# Patient Record
Sex: MALE | Race: WHITE | Employment: OTHER | URBAN - METROPOLITAN AREA
[De-identification: names, ages, dates, MRNs, and addresses within clinical notes are randomized per-mention and may not be internally consistent; named-entity substitution may affect disease eponyms.]

---

## 2018-06-19 ENCOUNTER — APPOINTMENT (OUTPATIENT)
Dept: GENERAL RADIOLOGY | Age: 73
DRG: 192 | End: 2018-06-19
Payer: MEDICARE

## 2018-06-19 ENCOUNTER — HOSPITAL ENCOUNTER (INPATIENT)
Age: 73
LOS: 1 days | Discharge: HOME | DRG: 192 | End: 2018-06-20
Attending: EMERGENCY MEDICINE | Admitting: INTERNAL MEDICINE
Payer: MEDICARE

## 2018-06-19 DIAGNOSIS — J44.1 COPD EXACERBATION (HCC): Primary | ICD-10-CM

## 2018-06-19 LAB
ABSOLUTE EOS #: 0 K/UL (ref 0–0.4)
ABSOLUTE IMMATURE GRANULOCYTE: ABNORMAL K/UL (ref 0–0.3)
ABSOLUTE LYMPH #: 1.8 K/UL (ref 1–4.8)
ABSOLUTE MONO #: 1 K/UL (ref 0.2–0.8)
ANION GAP SERPL CALCULATED.3IONS-SCNC: 16 MMOL/L (ref 9–17)
BASOPHILS # BLD: 0 % (ref 0–2)
BASOPHILS ABSOLUTE: 0 K/UL (ref 0–0.2)
BUN BLDV-MCNC: 20 MG/DL (ref 8–23)
BUN/CREAT BLD: 22 (ref 9–20)
CALCIUM SERPL-MCNC: 9.2 MG/DL (ref 8.6–10.4)
CHLORIDE BLD-SCNC: 100 MMOL/L (ref 98–107)
CO2: 23 MMOL/L (ref 20–31)
CREAT SERPL-MCNC: 0.92 MG/DL (ref 0.7–1.2)
DIFFERENTIAL TYPE: ABNORMAL
EKG ATRIAL RATE: 63 BPM
EKG P AXIS: 70 DEGREES
EKG P-R INTERVAL: 174 MS
EKG Q-T INTERVAL: 420 MS
EKG QRS DURATION: 70 MS
EKG QTC CALCULATION (BAZETT): 429 MS
EKG R AXIS: 83 DEGREES
EKG T AXIS: 60 DEGREES
EKG VENTRICULAR RATE: 63 BPM
EOSINOPHILS RELATIVE PERCENT: 0 % (ref 1–4)
GFR AFRICAN AMERICAN: >60 ML/MIN
GFR NON-AFRICAN AMERICAN: >60 ML/MIN
GFR SERPL CREATININE-BSD FRML MDRD: ABNORMAL ML/MIN/{1.73_M2}
GFR SERPL CREATININE-BSD FRML MDRD: ABNORMAL ML/MIN/{1.73_M2}
GLUCOSE BLD-MCNC: 116 MG/DL (ref 70–99)
HCT VFR BLD CALC: 52.8 % (ref 41–53)
HEMOGLOBIN: 17.4 G/DL (ref 13.5–17.5)
IMMATURE GRANULOCYTES: ABNORMAL %
LYMPHOCYTES # BLD: 12 % (ref 24–44)
MCH RBC QN AUTO: 32.4 PG (ref 26–34)
MCHC RBC AUTO-ENTMCNC: 32.9 G/DL (ref 31–37)
MCV RBC AUTO: 98.3 FL (ref 80–100)
MONOCYTES # BLD: 7 % (ref 1–7)
NRBC AUTOMATED: ABNORMAL PER 100 WBC
PDW BLD-RTO: 14.6 % (ref 11.5–14.5)
PLATELET # BLD: 224 K/UL (ref 130–400)
PLATELET ESTIMATE: ABNORMAL
PMV BLD AUTO: 8 FL (ref 6–12)
POTASSIUM SERPL-SCNC: 4.1 MMOL/L (ref 3.7–5.3)
RBC # BLD: 5.38 M/UL (ref 4.5–5.9)
RBC # BLD: ABNORMAL 10*6/UL
SEG NEUTROPHILS: 81 % (ref 36–66)
SEGMENTED NEUTROPHILS ABSOLUTE COUNT: 12.1 K/UL (ref 1.8–7.7)
SODIUM BLD-SCNC: 139 MMOL/L (ref 135–144)
WBC # BLD: 15 K/UL (ref 3.5–11)
WBC # BLD: ABNORMAL 10*3/UL

## 2018-06-19 PROCEDURE — 93005 ELECTROCARDIOGRAM TRACING: CPT

## 2018-06-19 PROCEDURE — 2580000003 HC RX 258: Performed by: NURSE PRACTITIONER

## 2018-06-19 PROCEDURE — 6360000002 HC RX W HCPCS: Performed by: EMERGENCY MEDICINE

## 2018-06-19 PROCEDURE — 87040 BLOOD CULTURE FOR BACTERIA: CPT

## 2018-06-19 PROCEDURE — 87205 SMEAR GRAM STAIN: CPT

## 2018-06-19 PROCEDURE — 94150 VITAL CAPACITY TEST: CPT

## 2018-06-19 PROCEDURE — 94640 AIRWAY INHALATION TREATMENT: CPT

## 2018-06-19 PROCEDURE — 1200000000 HC SEMI PRIVATE

## 2018-06-19 PROCEDURE — 71045 X-RAY EXAM CHEST 1 VIEW: CPT

## 2018-06-19 PROCEDURE — 6370000000 HC RX 637 (ALT 250 FOR IP): Performed by: NURSE PRACTITIONER

## 2018-06-19 PROCEDURE — 2580000003 HC RX 258: Performed by: EMERGENCY MEDICINE

## 2018-06-19 PROCEDURE — 80048 BASIC METABOLIC PNL TOTAL CA: CPT

## 2018-06-19 PROCEDURE — 99285 EMERGENCY DEPT VISIT HI MDM: CPT

## 2018-06-19 PROCEDURE — 85025 COMPLETE CBC W/AUTO DIFF WBC: CPT

## 2018-06-19 PROCEDURE — 87070 CULTURE OTHR SPECIMN AEROBIC: CPT

## 2018-06-19 RX ORDER — METHYLPREDNISOLONE SODIUM SUCCINATE 125 MG/2ML
80 INJECTION, POWDER, LYOPHILIZED, FOR SOLUTION INTRAMUSCULAR; INTRAVENOUS EVERY 8 HOURS
Status: DISCONTINUED | OUTPATIENT
Start: 2018-06-20 | End: 2018-06-20 | Stop reason: HOSPADM

## 2018-06-19 RX ORDER — METHYLPREDNISOLONE SODIUM SUCCINATE 125 MG/2ML
125 INJECTION, POWDER, LYOPHILIZED, FOR SOLUTION INTRAMUSCULAR; INTRAVENOUS ONCE
Status: COMPLETED | OUTPATIENT
Start: 2018-06-19 | End: 2018-06-19

## 2018-06-19 RX ORDER — ALBUTEROL SULFATE 2.5 MG/3ML
2.5 SOLUTION RESPIRATORY (INHALATION)
Status: DISCONTINUED | OUTPATIENT
Start: 2018-06-19 | End: 2018-06-20 | Stop reason: HOSPADM

## 2018-06-19 RX ORDER — IPRATROPIUM BROMIDE AND ALBUTEROL SULFATE 2.5; .5 MG/3ML; MG/3ML
1 SOLUTION RESPIRATORY (INHALATION)
Status: DISCONTINUED | OUTPATIENT
Start: 2018-06-20 | End: 2018-06-20 | Stop reason: HOSPADM

## 2018-06-19 RX ORDER — ALBUTEROL SULFATE 90 UG/1
2 AEROSOL, METERED RESPIRATORY (INHALATION) EVERY 6 HOURS PRN
COMMUNITY

## 2018-06-19 RX ORDER — ONDANSETRON 2 MG/ML
4 INJECTION INTRAMUSCULAR; INTRAVENOUS EVERY 6 HOURS PRN
Status: DISCONTINUED | OUTPATIENT
Start: 2018-06-19 | End: 2018-06-20 | Stop reason: HOSPADM

## 2018-06-19 RX ORDER — FAMOTIDINE 20 MG/1
20 TABLET, FILM COATED ORAL 2 TIMES DAILY
Status: DISCONTINUED | OUTPATIENT
Start: 2018-06-19 | End: 2018-06-20 | Stop reason: HOSPADM

## 2018-06-19 RX ORDER — ALBUTEROL SULFATE 2.5 MG/3ML
5 SOLUTION RESPIRATORY (INHALATION)
Status: DISCONTINUED | OUTPATIENT
Start: 2018-06-19 | End: 2018-06-19

## 2018-06-19 RX ORDER — IPRATROPIUM BROMIDE AND ALBUTEROL SULFATE 2.5; .5 MG/3ML; MG/3ML
1 SOLUTION RESPIRATORY (INHALATION)
Status: DISCONTINUED | OUTPATIENT
Start: 2018-06-19 | End: 2018-06-19

## 2018-06-19 RX ORDER — SODIUM CHLORIDE 0.9 % (FLUSH) 0.9 %
10 SYRINGE (ML) INJECTION EVERY 12 HOURS SCHEDULED
Status: DISCONTINUED | OUTPATIENT
Start: 2018-06-19 | End: 2018-06-20 | Stop reason: HOSPADM

## 2018-06-19 RX ORDER — NICOTINE 21 MG/24HR
1 PATCH, TRANSDERMAL 24 HOURS TRANSDERMAL DAILY PRN
Status: DISCONTINUED | OUTPATIENT
Start: 2018-06-19 | End: 2018-06-20 | Stop reason: HOSPADM

## 2018-06-19 RX ORDER — ATORVASTATIN CALCIUM 10 MG/1
10 TABLET, FILM COATED ORAL DAILY
COMMUNITY

## 2018-06-19 RX ORDER — SODIUM CHLORIDE 0.9 % (FLUSH) 0.9 %
10 SYRINGE (ML) INJECTION PRN
Status: DISCONTINUED | OUTPATIENT
Start: 2018-06-19 | End: 2018-06-20 | Stop reason: HOSPADM

## 2018-06-19 RX ORDER — ALBUTEROL SULFATE 90 UG/1
2 AEROSOL, METERED RESPIRATORY (INHALATION)
Status: DISCONTINUED | OUTPATIENT
Start: 2018-06-19 | End: 2018-06-19

## 2018-06-19 RX ORDER — ATORVASTATIN CALCIUM 10 MG/1
10 TABLET, FILM COATED ORAL DAILY
Status: DISCONTINUED | OUTPATIENT
Start: 2018-06-20 | End: 2018-06-20 | Stop reason: HOSPADM

## 2018-06-19 RX ORDER — BISACODYL 10 MG
10 SUPPOSITORY, RECTAL RECTAL DAILY PRN
Status: DISCONTINUED | OUTPATIENT
Start: 2018-06-19 | End: 2018-06-20 | Stop reason: HOSPADM

## 2018-06-19 RX ORDER — AZITHROMYCIN 250 MG/1
250 TABLET, FILM COATED ORAL DAILY
Status: DISCONTINUED | OUTPATIENT
Start: 2018-06-21 | End: 2018-06-20 | Stop reason: HOSPADM

## 2018-06-19 RX ORDER — LISINOPRIL 10 MG/1
10 TABLET ORAL 2 TIMES DAILY
Status: DISCONTINUED | OUTPATIENT
Start: 2018-06-19 | End: 2018-06-20 | Stop reason: HOSPADM

## 2018-06-19 RX ORDER — SODIUM CHLORIDE 9 MG/ML
INJECTION, SOLUTION INTRAVENOUS CONTINUOUS
Status: DISCONTINUED | OUTPATIENT
Start: 2018-06-19 | End: 2018-06-20 | Stop reason: HOSPADM

## 2018-06-19 RX ORDER — LISINOPRIL 10 MG/1
10 TABLET ORAL 2 TIMES DAILY
COMMUNITY

## 2018-06-19 RX ORDER — AZITHROMYCIN 250 MG/1
500 TABLET, FILM COATED ORAL DAILY
Status: COMPLETED | OUTPATIENT
Start: 2018-06-20 | End: 2018-06-20

## 2018-06-19 RX ORDER — AZITHROMYCIN 250 MG/1
250 TABLET, FILM COATED ORAL DAILY
COMMUNITY
End: 2018-06-20 | Stop reason: ALTCHOICE

## 2018-06-19 RX ADMIN — LISINOPRIL 10 MG: 10 TABLET ORAL at 22:16

## 2018-06-19 RX ADMIN — METHYLPREDNISOLONE SODIUM SUCCINATE 125 MG: 125 INJECTION, POWDER, FOR SOLUTION INTRAMUSCULAR; INTRAVENOUS at 19:50

## 2018-06-19 RX ADMIN — FAMOTIDINE 20 MG: 20 TABLET, FILM COATED ORAL at 22:16

## 2018-06-19 RX ADMIN — ALBUTEROL SULFATE 5 MG: 5 SOLUTION RESPIRATORY (INHALATION) at 19:49

## 2018-06-19 RX ADMIN — CEFTRIAXONE SODIUM 1 G: 1 INJECTION, POWDER, FOR SOLUTION INTRAMUSCULAR; INTRAVENOUS at 20:48

## 2018-06-19 RX ADMIN — AZITHROMYCIN MONOHYDRATE 500 MG: 500 INJECTION, POWDER, LYOPHILIZED, FOR SOLUTION INTRAVENOUS at 21:37

## 2018-06-19 RX ADMIN — SODIUM CHLORIDE: 9 INJECTION, SOLUTION INTRAVENOUS at 20:42

## 2018-06-19 RX ADMIN — ALBUTEROL SULFATE 5 MG: 5 SOLUTION RESPIRATORY (INHALATION) at 19:58

## 2018-06-19 RX ADMIN — IPRATROPIUM BROMIDE AND ALBUTEROL SULFATE 1 AMPULE: .5; 3 SOLUTION RESPIRATORY (INHALATION) at 23:47

## 2018-06-19 ASSESSMENT — ENCOUNTER SYMPTOMS
ABDOMINAL PAIN: 0
EYE REDNESS: 0
SHORTNESS OF BREATH: 1
DIARRHEA: 0
COUGH: 1
WHEEZING: 1
COLOR CHANGE: 0
FACIAL SWELLING: 0
CONSTIPATION: 0
EYE DISCHARGE: 0
VOMITING: 0

## 2018-06-19 ASSESSMENT — PAIN SCALES - GENERAL: PAINLEVEL_OUTOF10: 6

## 2018-06-20 ENCOUNTER — APPOINTMENT (OUTPATIENT)
Dept: GENERAL RADIOLOGY | Age: 73
DRG: 192 | End: 2018-06-20
Payer: MEDICARE

## 2018-06-20 VITALS
OXYGEN SATURATION: 91 % | TEMPERATURE: 98.6 F | WEIGHT: 160 LBS | HEIGHT: 70 IN | RESPIRATION RATE: 16 BRPM | HEART RATE: 71 BPM | BODY MASS INDEX: 22.9 KG/M2 | DIASTOLIC BLOOD PRESSURE: 67 MMHG | SYSTOLIC BLOOD PRESSURE: 117 MMHG

## 2018-06-20 PROBLEM — E78.2 MIXED HYPERLIPIDEMIA: Status: ACTIVE | Noted: 2018-06-20

## 2018-06-20 PROBLEM — I10 ESSENTIAL HYPERTENSION: Status: ACTIVE | Noted: 2018-06-20

## 2018-06-20 LAB
ANION GAP SERPL CALCULATED.3IONS-SCNC: 13 MMOL/L (ref 9–17)
BUN BLDV-MCNC: 20 MG/DL (ref 8–23)
BUN/CREAT BLD: 15 (ref 9–20)
CALCIUM SERPL-MCNC: 8.8 MG/DL (ref 8.6–10.4)
CHLORIDE BLD-SCNC: 101 MMOL/L (ref 98–107)
CO2: 24 MMOL/L (ref 20–31)
CREAT SERPL-MCNC: 1.3 MG/DL (ref 0.7–1.2)
GFR AFRICAN AMERICAN: >60 ML/MIN
GFR NON-AFRICAN AMERICAN: 55 ML/MIN
GFR SERPL CREATININE-BSD FRML MDRD: ABNORMAL ML/MIN/{1.73_M2}
GFR SERPL CREATININE-BSD FRML MDRD: ABNORMAL ML/MIN/{1.73_M2}
GLUCOSE BLD-MCNC: 172 MG/DL (ref 70–99)
HCT VFR BLD CALC: 46.1 % (ref 41–53)
HEMOGLOBIN: 15.6 G/DL (ref 13.5–17.5)
MCH RBC QN AUTO: 32.9 PG (ref 26–34)
MCHC RBC AUTO-ENTMCNC: 33.9 G/DL (ref 31–37)
MCV RBC AUTO: 97 FL (ref 80–100)
NRBC AUTOMATED: ABNORMAL PER 100 WBC
PDW BLD-RTO: 14.7 % (ref 11.5–14.5)
PLATELET # BLD: 196 K/UL (ref 130–400)
PMV BLD AUTO: 8.2 FL (ref 6–12)
POTASSIUM SERPL-SCNC: 4.6 MMOL/L (ref 3.7–5.3)
RBC # BLD: 4.75 M/UL (ref 4.5–5.9)
SODIUM BLD-SCNC: 138 MMOL/L (ref 135–144)
WBC # BLD: 11.1 K/UL (ref 3.5–11)

## 2018-06-20 PROCEDURE — G8988 SELF CARE GOAL STATUS: HCPCS

## 2018-06-20 PROCEDURE — 97165 OT EVAL LOW COMPLEX 30 MIN: CPT

## 2018-06-20 PROCEDURE — 6360000002 HC RX W HCPCS: Performed by: NURSE PRACTITIONER

## 2018-06-20 PROCEDURE — 94640 AIRWAY INHALATION TREATMENT: CPT

## 2018-06-20 PROCEDURE — 36415 COLL VENOUS BLD VENIPUNCTURE: CPT

## 2018-06-20 PROCEDURE — 80048 BASIC METABOLIC PNL TOTAL CA: CPT

## 2018-06-20 PROCEDURE — 2700000000 HC OXYGEN THERAPY PER DAY

## 2018-06-20 PROCEDURE — 6370000000 HC RX 637 (ALT 250 FOR IP): Performed by: NURSE PRACTITIONER

## 2018-06-20 PROCEDURE — 94761 N-INVAS EAR/PLS OXIMETRY MLT: CPT

## 2018-06-20 PROCEDURE — 97535 SELF CARE MNGMENT TRAINING: CPT

## 2018-06-20 PROCEDURE — 99223 1ST HOSP IP/OBS HIGH 75: CPT | Performed by: INTERNAL MEDICINE

## 2018-06-20 PROCEDURE — 71045 X-RAY EXAM CHEST 1 VIEW: CPT

## 2018-06-20 PROCEDURE — 85027 COMPLETE CBC AUTOMATED: CPT

## 2018-06-20 PROCEDURE — 97161 PT EVAL LOW COMPLEX 20 MIN: CPT

## 2018-06-20 PROCEDURE — G8979 MOBILITY GOAL STATUS: HCPCS

## 2018-06-20 PROCEDURE — G8987 SELF CARE CURRENT STATUS: HCPCS

## 2018-06-20 PROCEDURE — G8978 MOBILITY CURRENT STATUS: HCPCS

## 2018-06-20 RX ORDER — ACETAMINOPHEN 500 MG
500 TABLET ORAL EVERY 6 HOURS PRN
COMMUNITY

## 2018-06-20 RX ORDER — OXYCODONE HYDROCHLORIDE AND ACETAMINOPHEN 5; 325 MG/1; MG/1
1 TABLET ORAL EVERY 4 HOURS PRN
Status: DISCONTINUED | OUTPATIENT
Start: 2018-06-20 | End: 2018-06-20 | Stop reason: HOSPADM

## 2018-06-20 RX ORDER — PREDNISONE 10 MG/1
TABLET ORAL
Qty: 30 TABLET | Refills: 0 | Status: SHIPPED | OUTPATIENT
Start: 2018-06-20

## 2018-06-20 RX ORDER — CEPHALEXIN 250 MG/1
250 CAPSULE ORAL 4 TIMES DAILY
Qty: 40 CAPSULE | Refills: 0 | Status: SHIPPED | OUTPATIENT
Start: 2018-06-20 | End: 2018-06-30

## 2018-06-20 RX ORDER — ACETAMINOPHEN 325 MG/1
650 TABLET ORAL EVERY 4 HOURS PRN
Status: DISCONTINUED | OUTPATIENT
Start: 2018-06-20 | End: 2018-06-20 | Stop reason: HOSPADM

## 2018-06-20 RX ORDER — BUDESONIDE AND FORMOTEROL FUMARATE DIHYDRATE 160; 4.5 UG/1; UG/1
2 AEROSOL RESPIRATORY (INHALATION) 2 TIMES DAILY
COMMUNITY

## 2018-06-20 RX ORDER — AZITHROMYCIN 250 MG/1
250 TABLET, FILM COATED ORAL DAILY
Qty: 10 TABLET | Refills: 0 | Status: SHIPPED | OUTPATIENT
Start: 2018-06-20 | End: 2018-06-30

## 2018-06-20 RX ORDER — MORPHINE SULFATE 4 MG/ML
4 INJECTION, SOLUTION INTRAMUSCULAR; INTRAVENOUS
Status: DISCONTINUED | OUTPATIENT
Start: 2018-06-20 | End: 2018-06-20 | Stop reason: HOSPADM

## 2018-06-20 RX ORDER — OXYCODONE HYDROCHLORIDE AND ACETAMINOPHEN 5; 325 MG/1; MG/1
2 TABLET ORAL EVERY 4 HOURS PRN
Status: DISCONTINUED | OUTPATIENT
Start: 2018-06-20 | End: 2018-06-20 | Stop reason: HOSPADM

## 2018-06-20 RX ORDER — MORPHINE SULFATE 2 MG/ML
2 INJECTION, SOLUTION INTRAMUSCULAR; INTRAVENOUS
Status: DISCONTINUED | OUTPATIENT
Start: 2018-06-20 | End: 2018-06-20 | Stop reason: HOSPADM

## 2018-06-20 RX ADMIN — ONDANSETRON 4 MG: 2 INJECTION INTRAMUSCULAR; INTRAVENOUS at 00:23

## 2018-06-20 RX ADMIN — AZITHROMYCIN MONOHYDRATE 500 MG: 250 TABLET ORAL at 09:18

## 2018-06-20 RX ADMIN — IPRATROPIUM BROMIDE AND ALBUTEROL SULFATE 1 AMPULE: .5; 3 SOLUTION RESPIRATORY (INHALATION) at 11:54

## 2018-06-20 RX ADMIN — LISINOPRIL 10 MG: 10 TABLET ORAL at 09:17

## 2018-06-20 RX ADMIN — IPRATROPIUM BROMIDE AND ALBUTEROL SULFATE 1 AMPULE: .5; 3 SOLUTION RESPIRATORY (INHALATION) at 15:56

## 2018-06-20 RX ADMIN — ENOXAPARIN SODIUM 40 MG: 40 INJECTION SUBCUTANEOUS at 09:18

## 2018-06-20 RX ADMIN — METHYLPREDNISOLONE SODIUM SUCCINATE 80 MG: 125 INJECTION, POWDER, FOR SOLUTION INTRAMUSCULAR; INTRAVENOUS at 12:12

## 2018-06-20 RX ADMIN — IPRATROPIUM BROMIDE AND ALBUTEROL SULFATE 1 AMPULE: .5; 3 SOLUTION RESPIRATORY (INHALATION) at 08:20

## 2018-06-20 RX ADMIN — METHYLPREDNISOLONE SODIUM SUCCINATE 80 MG: 125 INJECTION, POWDER, FOR SOLUTION INTRAMUSCULAR; INTRAVENOUS at 04:53

## 2018-06-20 RX ADMIN — FAMOTIDINE 20 MG: 20 TABLET, FILM COATED ORAL at 09:17

## 2018-06-20 RX ADMIN — ATORVASTATIN CALCIUM 10 MG: 10 TABLET, FILM COATED ORAL at 09:20

## 2018-06-20 ASSESSMENT — PAIN SCALES - GENERAL
PAINLEVEL_OUTOF10: 0

## 2018-06-25 LAB
CULTURE: NORMAL
CULTURE: NORMAL
Lab: NORMAL
Lab: NORMAL
SPECIMEN DESCRIPTION: NORMAL
SPECIMEN DESCRIPTION: NORMAL
STATUS: NORMAL
STATUS: NORMAL

## 2018-06-29 LAB
CULTURE: NORMAL
DIRECT EXAM: NORMAL
Lab: NORMAL
SPECIMEN DESCRIPTION: NORMAL
STATUS: NORMAL

## 2021-12-13 ENCOUNTER — HOSPITAL ENCOUNTER (OUTPATIENT)
Age: 76
Setting detail: SPECIMEN
Discharge: HOME | End: 2021-12-13

## 2021-12-13 LAB
ALBUMIN SERPL-MCNC: 3.8 G/DL (ref 3.5–5.2)
ALBUMIN/GLOBULIN RATIO: 1.2 (ref 1–2.5)
ALP BLD-CCNC: 136 U/L (ref 40–129)
ALT SERPL-CCNC: 16 U/L (ref 5–41)
ANION GAP SERPL CALCULATED.3IONS-SCNC: 17 MMOL/L (ref 9–17)
AST SERPL-CCNC: 17 U/L
BILIRUB SERPL-MCNC: 0.29 MG/DL (ref 0.3–1.2)
BUN BLDV-MCNC: 18 MG/DL (ref 8–23)
BUN/CREAT BLD: ABNORMAL (ref 9–20)
CALCIUM SERPL-MCNC: 9.4 MG/DL (ref 8.6–10.4)
CHLORIDE BLD-SCNC: 109 MMOL/L (ref 98–107)
CHOLESTEROL, FASTING: 186 MG/DL
CHOLESTEROL/HDL RATIO: 2.8
CO2: 23 MMOL/L (ref 20–31)
CREAT SERPL-MCNC: 0.94 MG/DL (ref 0.7–1.2)
GFR AFRICAN AMERICAN: >60 ML/MIN
GFR NON-AFRICAN AMERICAN: >60 ML/MIN
GFR SERPL CREATININE-BSD FRML MDRD: ABNORMAL ML/MIN/{1.73_M2}
GFR SERPL CREATININE-BSD FRML MDRD: ABNORMAL ML/MIN/{1.73_M2}
GLUCOSE BLD-MCNC: 77 MG/DL (ref 70–99)
HDLC SERPL-MCNC: 67 MG/DL
LDL CHOLESTEROL: 108 MG/DL (ref 0–130)
POTASSIUM SERPL-SCNC: 3.8 MMOL/L (ref 3.7–5.3)
PROSTATE SPECIFIC ANTIGEN: 1.66 UG/L
SODIUM BLD-SCNC: 149 MMOL/L (ref 135–144)
TOTAL PROTEIN: 6.9 G/DL (ref 6.4–8.3)
TRIGLYCERIDE, FASTING: 53 MG/DL
VLDLC SERPL CALC-MCNC: NORMAL MG/DL (ref 1–30)

## 2022-12-09 ENCOUNTER — HOSPITAL ENCOUNTER (OUTPATIENT)
Age: 77
Setting detail: SPECIMEN
Discharge: HOME | End: 2022-12-09

## 2022-12-09 LAB
ALBUMIN SERPL-MCNC: 4 G/DL (ref 3.5–5.2)
ALBUMIN/GLOBULIN RATIO: 1 (ref 1–2.5)
ALP BLD-CCNC: 136 U/L (ref 40–129)
ALT SERPL-CCNC: 13 U/L (ref 5–41)
ANION GAP SERPL CALCULATED.3IONS-SCNC: 9 MMOL/L (ref 9–17)
AST SERPL-CCNC: 21 U/L
BILIRUB SERPL-MCNC: 0.5 MG/DL (ref 0.3–1.2)
BUN BLDV-MCNC: 16 MG/DL (ref 8–23)
CALCIUM SERPL-MCNC: 9.6 MG/DL (ref 8.6–10.4)
CHLORIDE BLD-SCNC: 99 MMOL/L (ref 98–107)
CHOLESTEROL/HDL RATIO: 3
CHOLESTEROL: 196 MG/DL
CO2: 34 MMOL/L (ref 20–31)
CREAT SERPL-MCNC: 0.8 MG/DL (ref 0.7–1.2)
GFR SERPL CREATININE-BSD FRML MDRD: >60 ML/MIN/1.73M2
GLUCOSE BLD-MCNC: 84 MG/DL (ref 70–99)
HDLC SERPL-MCNC: 65 MG/DL
LDL CHOLESTEROL: 117 MG/DL (ref 0–130)
POTASSIUM SERPL-SCNC: 3.7 MMOL/L (ref 3.7–5.3)
PROSTATE SPECIFIC ANTIGEN: 1.67 NG/ML
SODIUM BLD-SCNC: 142 MMOL/L (ref 135–144)
TOTAL PROTEIN: 7.9 G/DL (ref 6.4–8.3)
TRIGL SERPL-MCNC: 72 MG/DL

## 2023-01-01 ENCOUNTER — APPOINTMENT (OUTPATIENT)
Dept: GENERAL RADIOLOGY | Age: 78
End: 2023-01-01
Payer: MEDICARE

## 2023-01-01 ENCOUNTER — APPOINTMENT (OUTPATIENT)
Dept: CT IMAGING | Age: 78
End: 2023-01-01
Payer: MEDICARE

## 2023-01-01 ENCOUNTER — HOSPITAL ENCOUNTER (INPATIENT)
Age: 78
LOS: 9 days | End: 2023-11-11
Attending: EMERGENCY MEDICINE
Payer: MEDICARE

## 2023-01-01 ENCOUNTER — APPOINTMENT (OUTPATIENT)
Age: 78
End: 2023-01-01
Payer: MEDICARE

## 2023-01-01 VITALS
BODY MASS INDEX: 21.53 KG/M2 | WEIGHT: 150.35 LBS | SYSTOLIC BLOOD PRESSURE: 108 MMHG | TEMPERATURE: 98.2 F | OXYGEN SATURATION: 87 % | DIASTOLIC BLOOD PRESSURE: 54 MMHG | HEIGHT: 70 IN

## 2023-01-01 DIAGNOSIS — R60.0 BILATERAL LEG EDEMA: Primary | ICD-10-CM

## 2023-01-01 DIAGNOSIS — J44.1 COPD EXACERBATION (HCC): ICD-10-CM

## 2023-01-01 LAB
ALLEN TEST: POSITIVE
ALLEN TEST: POSITIVE
ANION GAP SERPL CALCULATED.3IONS-SCNC: 11 MMOL/L (ref 9–17)
ANION GAP SERPL CALCULATED.3IONS-SCNC: 4 MMOL/L (ref 9–17)
ANION GAP SERPL CALCULATED.3IONS-SCNC: 5 MMOL/L (ref 9–17)
ANION GAP SERPL CALCULATED.3IONS-SCNC: 5 MMOL/L (ref 9–17)
ANION GAP SERPL CALCULATED.3IONS-SCNC: 6 MMOL/L (ref 9–17)
ANION GAP SERPL CALCULATED.3IONS-SCNC: 8 MMOL/L (ref 9–17)
ANION GAP SERPL CALCULATED.3IONS-SCNC: ABNORMAL MMOL/L (ref 9–17)
BASOPHILS # BLD: 0 K/UL (ref 0–0.2)
BASOPHILS # BLD: <0.03 K/UL (ref 0–0.2)
BASOPHILS NFR BLD: 0 %
BASOPHILS NFR BLD: 0 %
BASOPHILS NFR BLD: 0 % (ref 0–2)
BNP SERPL-MCNC: 648 PG/ML
BNP SERPL-MCNC: 684 PG/ML
BUN SERPL-MCNC: 15 MG/DL (ref 8–23)
BUN SERPL-MCNC: 23 MG/DL (ref 8–23)
BUN SERPL-MCNC: 44 MG/DL (ref 8–23)
BUN SERPL-MCNC: 46 MG/DL (ref 8–23)
BUN SERPL-MCNC: 52 MG/DL (ref 8–23)
BUN SERPL-MCNC: 57 MG/DL (ref 8–23)
BUN SERPL-MCNC: 64 MG/DL (ref 8–23)
BUN SERPL-MCNC: 67 MG/DL (ref 8–23)
BUN SERPL-MCNC: 67 MG/DL (ref 8–23)
BUN/CREAT SERPL: 25 (ref 9–20)
BUN/CREAT SERPL: 29 (ref 9–20)
BUN/CREAT SERPL: 49 (ref 9–20)
BUN/CREAT SERPL: 58 (ref 9–20)
BUN/CREAT SERPL: 74 (ref 9–20)
BUN/CREAT SERPL: 84 (ref 9–20)
BUN/CREAT SERPL: 91 (ref 9–20)
BUN/CREAT SERPL: 92 (ref 9–20)
BUN/CREAT SERPL: 95 (ref 9–20)
CALCIUM SERPL-MCNC: 8.9 MG/DL (ref 8.6–10.4)
CALCIUM SERPL-MCNC: 9 MG/DL (ref 8.6–10.4)
CALCIUM SERPL-MCNC: 9 MG/DL (ref 8.6–10.4)
CALCIUM SERPL-MCNC: 9.1 MG/DL (ref 8.6–10.4)
CALCIUM SERPL-MCNC: 9.2 MG/DL (ref 8.6–10.4)
CALCIUM SERPL-MCNC: 9.3 MG/DL (ref 8.6–10.4)
CALCIUM SERPL-MCNC: 9.6 MG/DL (ref 8.6–10.4)
CHLORIDE SERPL-SCNC: 100 MMOL/L (ref 98–107)
CHLORIDE SERPL-SCNC: 103 MMOL/L (ref 98–107)
CHLORIDE SERPL-SCNC: 104 MMOL/L (ref 98–107)
CHLORIDE SERPL-SCNC: 105 MMOL/L (ref 98–107)
CHLORIDE SERPL-SCNC: 105 MMOL/L (ref 98–107)
CHLORIDE SERPL-SCNC: 110 MMOL/L (ref 98–107)
CHLORIDE SERPL-SCNC: 94 MMOL/L (ref 98–107)
CHLORIDE SERPL-SCNC: 95 MMOL/L (ref 98–107)
CHLORIDE SERPL-SCNC: 98 MMOL/L (ref 98–107)
CO2 BLD CALC-SCNC: 45 MMOL/L (ref 22–30)
CO2 SERPL-SCNC: 36 MMOL/L (ref 20–31)
CO2 SERPL-SCNC: 37 MMOL/L (ref 20–31)
CO2 SERPL-SCNC: 38 MMOL/L (ref 20–31)
CO2 SERPL-SCNC: 38 MMOL/L (ref 20–31)
CO2 SERPL-SCNC: 40 MMOL/L (ref 20–31)
CO2 SERPL-SCNC: 40 MMOL/L (ref 20–31)
CO2 SERPL-SCNC: >45 MMOL/L (ref 20–31)
CREAT SERPL-MCNC: 0.5 MG/DL (ref 0.7–1.2)
CREAT SERPL-MCNC: 0.6 MG/DL (ref 0.7–1.2)
CREAT SERPL-MCNC: 0.6 MG/DL (ref 0.7–1.2)
CREAT SERPL-MCNC: 0.7 MG/DL (ref 0.7–1.2)
CREAT SERPL-MCNC: 0.8 MG/DL (ref 0.7–1.2)
CREAT SERPL-MCNC: 0.8 MG/DL (ref 0.7–1.2)
CREAT SERPL-MCNC: 0.9 MG/DL (ref 0.7–1.2)
CRITICAL ACTION: NORMAL
CRITICAL NOTIFICATION DATE/TIME: NORMAL
CRITICAL NOTIFICATION: NORMAL
CRITICAL VALUE READ BACK: YES
ECHO AO ROOT DIAM: 3.4 CM
ECHO AV PEAK GRADIENT: 7 MMHG
ECHO AV PEAK VELOCITY: 1.3 M/S
ECHO EST RA PRESSURE: 3 MMHG
ECHO LA AREA 4C: 14.6 CM2
ECHO LA DIAMETER: 3.2 CM
ECHO LA MAJOR AXIS: 5.6 CM
ECHO LA TO AORTIC ROOT RATIO: 0.94
ECHO LA VOL A-L A4C: 31 ML (ref 18–58)
ECHO LV E' LATERAL VELOCITY: 8 CM/S
ECHO LV FRACTIONAL SHORTENING: 32 % (ref 28–44)
ECHO LV INTERNAL DIMENSION DIASTOLIC: 4.7 CM (ref 4.2–5.9)
ECHO LV INTERNAL DIMENSION SYSTOLIC: 3.2 CM
ECHO LV IVSD: 1 CM (ref 0.6–1)
ECHO LV MASS 2D: 164.5 G (ref 88–224)
ECHO LV POSTERIOR WALL DIASTOLIC: 1 CM (ref 0.6–1)
ECHO LV RELATIVE WALL THICKNESS RATIO: 0.43
ECHO MV A VELOCITY: 0.96 M/S
ECHO MV E DECELERATION TIME (DT): 176 MS
ECHO MV E VELOCITY: 0.85 M/S
ECHO MV E/A RATIO: 0.89
ECHO MV E/E' LATERAL: 10.63
ECHO RIGHT VENTRICULAR SYSTOLIC PRESSURE (RVSP): 38 MMHG
ECHO TV REGURGITANT MAX VELOCITY: 2.96 M/S
ECHO TV REGURGITANT PEAK GRADIENT: 35 MMHG
EKG ATRIAL RATE: 79 BPM
EKG P AXIS: 83 DEGREES
EKG P-R INTERVAL: 162 MS
EKG Q-T INTERVAL: 326 MS
EKG QRS DURATION: 74 MS
EKG QTC CALCULATION (BAZETT): 373 MS
EKG R AXIS: 24 DEGREES
EKG T AXIS: 70 DEGREES
EKG VENTRICULAR RATE: 79 BPM
EOSINOPHIL # BLD: 0 K/UL (ref 0–0.4)
EOSINOPHIL # BLD: 0 K/UL (ref 0–0.4)
EOSINOPHIL # BLD: 0 K/UL (ref 0–0.44)
EOSINOPHIL # BLD: 0 K/UL (ref 0–0.44)
EOSINOPHIL # BLD: 0.1 K/UL (ref 0–0.44)
EOSINOPHILS RELATIVE PERCENT: 0 % (ref 1–4)
EOSINOPHILS RELATIVE PERCENT: 1 % (ref 1–4)
ERYTHROCYTE [DISTWIDTH] IN BLOOD BY AUTOMATED COUNT: 14.9 % (ref 11.8–14.4)
ERYTHROCYTE [DISTWIDTH] IN BLOOD BY AUTOMATED COUNT: 15.1 % (ref 11.8–14.4)
ERYTHROCYTE [DISTWIDTH] IN BLOOD BY AUTOMATED COUNT: 15.9 % (ref 11.8–14.4)
ERYTHROCYTE [DISTWIDTH] IN BLOOD BY AUTOMATED COUNT: 16.1 % (ref 11.8–14.4)
ERYTHROCYTE [DISTWIDTH] IN BLOOD BY AUTOMATED COUNT: 16.6 % (ref 11.8–14.4)
ERYTHROCYTE [DISTWIDTH] IN BLOOD BY AUTOMATED COUNT: 16.7 % (ref 11.8–14.4)
ERYTHROCYTE [DISTWIDTH] IN BLOOD BY AUTOMATED COUNT: 16.8 % (ref 11.8–14.4)
ERYTHROCYTE [DISTWIDTH] IN BLOOD BY AUTOMATED COUNT: 16.9 % (ref 11.8–14.4)
ERYTHROCYTE [DISTWIDTH] IN BLOOD BY AUTOMATED COUNT: 17 % (ref 11.8–14.4)
FIO2: 30
FIO2: 35
FIO2: 40
FIO2: 45
FIO2: 45
FIO2: 5
FIO2: 55
FIO2: 60
FLUAV RNA RESP QL NAA+PROBE: NOT DETECTED
FLUBV RNA RESP QL NAA+PROBE: NOT DETECTED
GFR SERPL CREATININE-BSD FRML MDRD: >60 ML/MIN/1.73M2
GLUCOSE BLD-MCNC: 138 MG/DL (ref 75–110)
GLUCOSE BLD-MCNC: 141 MG/DL (ref 75–110)
GLUCOSE BLD-MCNC: 145 MG/DL (ref 75–110)
GLUCOSE BLD-MCNC: 150 MG/DL (ref 75–110)
GLUCOSE BLD-MCNC: 150 MG/DL (ref 75–110)
GLUCOSE BLD-MCNC: 151 MG/DL (ref 75–110)
GLUCOSE BLD-MCNC: 155 MG/DL (ref 75–110)
GLUCOSE BLD-MCNC: 156 MG/DL (ref 75–110)
GLUCOSE BLD-MCNC: 157 MG/DL (ref 75–110)
GLUCOSE BLD-MCNC: 166 MG/DL (ref 75–110)
GLUCOSE BLD-MCNC: 168 MG/DL (ref 75–110)
GLUCOSE BLD-MCNC: 176 MG/DL (ref 75–110)
GLUCOSE BLD-MCNC: 178 MG/DL (ref 75–110)
GLUCOSE BLD-MCNC: 182 MG/DL (ref 75–110)
GLUCOSE BLD-MCNC: 186 MG/DL (ref 75–110)
GLUCOSE BLD-MCNC: 204 MG/DL (ref 75–110)
GLUCOSE BLD-MCNC: 204 MG/DL (ref 75–110)
GLUCOSE BLD-MCNC: 214 MG/DL (ref 75–110)
GLUCOSE BLD-MCNC: 219 MG/DL (ref 75–110)
GLUCOSE SERPL-MCNC: 126 MG/DL (ref 70–99)
GLUCOSE SERPL-MCNC: 144 MG/DL (ref 70–99)
GLUCOSE SERPL-MCNC: 145 MG/DL (ref 70–99)
GLUCOSE SERPL-MCNC: 148 MG/DL (ref 70–99)
GLUCOSE SERPL-MCNC: 162 MG/DL (ref 70–99)
GLUCOSE SERPL-MCNC: 170 MG/DL (ref 70–99)
GLUCOSE SERPL-MCNC: 174 MG/DL (ref 70–99)
GLUCOSE SERPL-MCNC: 203 MG/DL (ref 70–99)
GLUCOSE SERPL-MCNC: 232 MG/DL (ref 70–99)
HCO3 VENOUS: 38.3 MMOL/L (ref 22–29)
HCT VFR BLD AUTO: 31.8 % (ref 40.7–50.3)
HCT VFR BLD AUTO: 32.9 % (ref 40.7–50.3)
HCT VFR BLD AUTO: 32.9 % (ref 40.7–50.3)
HCT VFR BLD AUTO: 33.6 % (ref 40.7–50.3)
HCT VFR BLD AUTO: 34.2 % (ref 40.7–50.3)
HCT VFR BLD AUTO: 34.6 % (ref 40.7–50.3)
HCT VFR BLD AUTO: 34.7 % (ref 40.7–50.3)
HCT VFR BLD AUTO: 35.1 % (ref 40.7–50.3)
HCT VFR BLD AUTO: 37.3 % (ref 40.7–50.3)
HGB BLD-MCNC: 10.1 G/DL (ref 13–17)
HGB BLD-MCNC: 10.1 G/DL (ref 13–17)
HGB BLD-MCNC: 10.2 G/DL (ref 13–17)
HGB BLD-MCNC: 10.2 G/DL (ref 13–17)
HGB BLD-MCNC: 10.4 G/DL (ref 13–17)
HGB BLD-MCNC: 10.5 G/DL (ref 13–17)
HGB BLD-MCNC: 11.1 G/DL (ref 13–17)
HGB BLD-MCNC: 9.5 G/DL (ref 13–17)
HGB BLD-MCNC: 9.9 G/DL (ref 13–17)
IMM GRANULOCYTES # BLD AUTO: 0 K/UL (ref 0–0.3)
IMM GRANULOCYTES # BLD AUTO: 0.05 K/UL (ref 0–0.3)
IMM GRANULOCYTES # BLD AUTO: 0.48 K/UL (ref 0–0.3)
IMM GRANULOCYTES # BLD AUTO: 0.64 K/UL (ref 0–0.3)
IMM GRANULOCYTES # BLD AUTO: 0.95 K/UL (ref 0–0.3)
IMM GRANULOCYTES NFR BLD: 0 %
IMM GRANULOCYTES NFR BLD: 0 %
IMM GRANULOCYTES NFR BLD: 2 %
IMM GRANULOCYTES NFR BLD: 3 %
IMM GRANULOCYTES NFR BLD: 4 %
INR PPP: 1
INR PPP: NORMAL
LYMPHOCYTES NFR BLD: 0.16 K/UL (ref 1–4.8)
LYMPHOCYTES NFR BLD: 0.21 K/UL (ref 1.1–3.7)
LYMPHOCYTES NFR BLD: 0.24 K/UL (ref 1.1–3.7)
LYMPHOCYTES NFR BLD: 0.24 K/UL (ref 1–4.8)
LYMPHOCYTES NFR BLD: 1.07 K/UL (ref 1.1–3.7)
LYMPHOCYTES RELATIVE PERCENT: 1 % (ref 24–43)
LYMPHOCYTES RELATIVE PERCENT: 1 % (ref 24–43)
LYMPHOCYTES RELATIVE PERCENT: 1 % (ref 24–44)
LYMPHOCYTES RELATIVE PERCENT: 2 % (ref 24–44)
LYMPHOCYTES RELATIVE PERCENT: 8 % (ref 24–43)
MAGNESIUM SERPL-MCNC: 1.9 MG/DL (ref 1.6–2.6)
MCH RBC QN AUTO: 30.8 PG (ref 25.2–33.5)
MCH RBC QN AUTO: 31 PG (ref 25.2–33.5)
MCH RBC QN AUTO: 31 PG (ref 25.2–33.5)
MCH RBC QN AUTO: 31.1 PG (ref 25.2–33.5)
MCH RBC QN AUTO: 31.1 PG (ref 25.2–33.5)
MCH RBC QN AUTO: 31.2 PG (ref 25.2–33.5)
MCH RBC QN AUTO: 31.3 PG (ref 25.2–33.5)
MCH RBC QN AUTO: 31.4 PG (ref 25.2–33.5)
MCH RBC QN AUTO: 31.5 PG (ref 25.2–33.5)
MCHC RBC AUTO-ENTMCNC: 29.4 G/DL (ref 28.4–34.8)
MCHC RBC AUTO-ENTMCNC: 29.5 G/DL (ref 28.4–34.8)
MCHC RBC AUTO-ENTMCNC: 29.5 G/DL (ref 28.4–34.8)
MCHC RBC AUTO-ENTMCNC: 29.6 G/DL (ref 28.4–34.8)
MCHC RBC AUTO-ENTMCNC: 29.8 G/DL (ref 28.4–34.8)
MCHC RBC AUTO-ENTMCNC: 29.9 G/DL (ref 28.4–34.8)
MCHC RBC AUTO-ENTMCNC: 30.7 G/DL (ref 28.4–34.8)
MCV RBC AUTO: 101.2 FL (ref 82.6–102.9)
MCV RBC AUTO: 102.2 FL (ref 82.6–102.9)
MCV RBC AUTO: 102.5 FL (ref 82.6–102.9)
MCV RBC AUTO: 104.2 FL (ref 82.6–102.9)
MCV RBC AUTO: 104.5 FL (ref 82.6–102.9)
MCV RBC AUTO: 104.5 FL (ref 82.6–102.9)
MCV RBC AUTO: 104.6 FL (ref 82.6–102.9)
MCV RBC AUTO: 105.8 FL (ref 82.6–102.9)
MCV RBC AUTO: 106 FL (ref 82.6–102.9)
MODE: ABNORMAL
MODE: AC
MONOCYTES NFR BLD: 0.25 K/UL (ref 0.2–0.8)
MONOCYTES NFR BLD: 0.47 K/UL (ref 0.1–1.2)
MONOCYTES NFR BLD: 0.84 K/UL (ref 0.1–1.2)
MONOCYTES NFR BLD: 0.86 K/UL (ref 0.1–1.2)
MONOCYTES NFR BLD: 0.96 K/UL (ref 0.2–0.8)
MONOCYTES NFR BLD: 2 % (ref 3–12)
MONOCYTES NFR BLD: 3 % (ref 1–7)
MONOCYTES NFR BLD: 4 % (ref 1–7)
MONOCYTES NFR BLD: 4 % (ref 3–12)
MONOCYTES NFR BLD: 6 % (ref 3–12)
NEUTROPHILS NFR BLD: 85 % (ref 36–65)
NEUTROPHILS NFR BLD: 92 % (ref 36–65)
NEUTROPHILS NFR BLD: 93 % (ref 36–65)
NEUTROPHILS NFR BLD: 93 % (ref 36–66)
NEUTROPHILS NFR BLD: 95 % (ref 36–66)
NEUTS SEG NFR BLD: 11.44 K/UL (ref 1.5–8.1)
NEUTS SEG NFR BLD: 19.69 K/UL (ref 1.5–8.1)
NEUTS SEG NFR BLD: 22.04 K/UL (ref 1.5–8.1)
NEUTS SEG NFR BLD: 22.22 K/UL (ref 1.8–7.7)
NEUTS SEG NFR BLD: 7.79 K/UL (ref 1.8–7.7)
NRBC BLD-RTO: 0 PER 100 WBC
O2 DELIVERY DEVICE: ABNORMAL
O2 SAT, VEN: 93 % (ref 60–85)
PARTIAL THROMBOPLASTIN TIME: 28.9 SEC (ref 23.9–33.8)
PARTIAL THROMBOPLASTIN TIME: NORMAL SEC (ref 23.9–33.8)
PATIENT TEMP: 37
PCO2, VEN: 48.7 MM HG (ref 41–51)
PH VENOUS: 7.5 (ref 7.32–7.43)
PLATELET # BLD AUTO: 138 K/UL (ref 138–453)
PLATELET # BLD AUTO: 140 K/UL (ref 138–453)
PLATELET # BLD AUTO: 142 K/UL (ref 138–453)
PLATELET # BLD AUTO: 143 K/UL (ref 138–453)
PLATELET # BLD AUTO: 154 K/UL (ref 138–453)
PLATELET # BLD AUTO: 156 K/UL (ref 138–453)
PLATELET # BLD AUTO: 165 K/UL (ref 138–453)
PLATELET # BLD AUTO: 176 K/UL (ref 138–453)
PLATELET # BLD AUTO: 186 K/UL (ref 138–453)
PMV BLD AUTO: 10.9 FL (ref 8.1–13.5)
PMV BLD AUTO: 11 FL (ref 8.1–13.5)
PMV BLD AUTO: 11 FL (ref 8.1–13.5)
PMV BLD AUTO: 11.1 FL (ref 8.1–13.5)
PMV BLD AUTO: 11.4 FL (ref 8.1–13.5)
PMV BLD AUTO: 11.4 FL (ref 8.1–13.5)
PMV BLD AUTO: 11.6 FL (ref 8.1–13.5)
PMV BLD AUTO: 12.2 FL (ref 8.1–13.5)
PMV BLD AUTO: 12.3 FL (ref 8.1–13.5)
PO2, VEN: 62.1 MM HG (ref 30–50)
POC HCO3: 35.4 MMOL/L (ref 21–28)
POC HCO3: 36.1 MMOL/L (ref 21–28)
POC HCO3: 37.5 MMOL/L (ref 21–28)
POC HCO3: 38.9 MMOL/L (ref 21–28)
POC HCO3: 39.1 MMOL/L (ref 21–28)
POC HCO3: 39.7 MMOL/L (ref 21–28)
POC HCO3: 40.9 MMOL/L (ref 21–28)
POC HCO3: 41 MMOL/L (ref 21–28)
POC HCO3: 43.5 MMOL/L (ref 21–28)
POC HCO3: 44.8 MMOL/L (ref 21–28)
POC HCO3: 49.2 MMOL/L (ref 21–28)
POC O2 SATURATION: 89.5 % (ref 94–98)
POC O2 SATURATION: 89.6 % (ref 94–98)
POC O2 SATURATION: 90.2 % (ref 94–98)
POC O2 SATURATION: 91 % (ref 94–98)
POC O2 SATURATION: 93 % (ref 94–98)
POC O2 SATURATION: 95.8 % (ref 94–98)
POC O2 SATURATION: 97.4 % (ref 94–98)
POC O2 SATURATION: 97.6 % (ref 94–98)
POC O2 SATURATION: 97.7 % (ref 94–98)
POC O2 SATURATION: 98.1 % (ref 94–98)
POC O2 SATURATION: 98.2 % (ref 94–98)
POC PCO2: 45.8 MM HG (ref 35–48)
POC PCO2: 46.9 MM HG (ref 35–48)
POC PCO2: 53.3 MM HG (ref 35–48)
POC PCO2: 53.3 MM HG (ref 35–48)
POC PCO2: 60 MM HG (ref 35–48)
POC PCO2: 62.2 MM HG (ref 35–48)
POC PCO2: 66.3 MM HG (ref 35–48)
POC PCO2: 67.4 MM HG (ref 35–48)
POC PCO2: 72.7 MM HG (ref 35–48)
POC PCO2: 73.9 MM HG (ref 35–48)
POC PCO2: 76.2 MM HG (ref 35–48)
POC PH: 7.34 (ref 7.35–7.45)
POC PH: 7.35 (ref 7.35–7.45)
POC PH: 7.38 (ref 7.35–7.45)
POC PH: 7.4 (ref 7.35–7.45)
POC PH: 7.41 (ref 7.35–7.45)
POC PH: 7.42 (ref 7.35–7.45)
POC PH: 7.43 (ref 7.35–7.45)
POC PH: 7.44 (ref 7.35–7.45)
POC PH: 7.5 (ref 7.35–7.45)
POC PH: 7.52 (ref 7.35–7.45)
POC PH: 7.55 (ref 7.35–7.45)
POC PO2: 108.8 MM HG (ref 83–108)
POC PO2: 110.1 MM HG (ref 83–108)
POC PO2: 116.3 MM HG (ref 83–108)
POC PO2: 57.3 MM HG (ref 83–108)
POC PO2: 62.1 MM HG (ref 83–108)
POC PO2: 62.2 MM HG (ref 83–108)
POC PO2: 63 MM HG (ref 83–108)
POC PO2: 64.2 MM HG (ref 83–108)
POC PO2: 74.5 MM HG (ref 83–108)
POC PO2: 87.4 MM HG (ref 83–108)
POC PO2: 98.1 MM HG (ref 83–108)
POSITIVE BASE EXCESS, ART: 10.3 MMOL/L (ref 0–3)
POSITIVE BASE EXCESS, ART: 10.3 MMOL/L (ref 0–3)
POSITIVE BASE EXCESS, ART: 10.8 MMOL/L (ref 0–3)
POSITIVE BASE EXCESS, ART: 10.9 MMOL/L (ref 0–3)
POSITIVE BASE EXCESS, ART: 11.4 MMOL/L (ref 0–3)
POSITIVE BASE EXCESS, ART: 12.3 MMOL/L (ref 0–3)
POSITIVE BASE EXCESS, ART: 13 MMOL/L (ref 0–3)
POSITIVE BASE EXCESS, ART: 16.3 MMOL/L (ref 0–3)
POSITIVE BASE EXCESS, ART: 17.4 MMOL/L (ref 0–3)
POSITIVE BASE EXCESS, ART: 18.2 MMOL/L (ref 0–3)
POSITIVE BASE EXCESS, ART: 20.5 MMOL/L (ref 0–3)
POSITIVE BASE EXCESS, VEN: 13.5 MMOL/L (ref 0–3)
POTASSIUM SERPL-SCNC: 3.4 MMOL/L (ref 3.7–5.3)
POTASSIUM SERPL-SCNC: 3.5 MMOL/L (ref 3.7–5.3)
POTASSIUM SERPL-SCNC: 3.6 MMOL/L (ref 3.7–5.3)
POTASSIUM SERPL-SCNC: 3.8 MMOL/L (ref 3.7–5.3)
POTASSIUM SERPL-SCNC: 3.9 MMOL/L (ref 3.7–5.3)
POTASSIUM SERPL-SCNC: 4.2 MMOL/L (ref 3.7–5.3)
POTASSIUM SERPL-SCNC: 4.3 MMOL/L (ref 3.7–5.3)
POTASSIUM SERPL-SCNC: 4.4 MMOL/L (ref 3.7–5.3)
POTASSIUM SERPL-SCNC: 4.6 MMOL/L (ref 3.7–5.3)
POTASSIUM SERPL-SCNC: 4.7 MMOL/L (ref 3.7–5.3)
PROCALCITONIN SERPL-MCNC: 0.07 NG/ML (ref 0–0.09)
PROTHROMBIN TIME: 12.5 SEC (ref 11.5–14.2)
PROTHROMBIN TIME: NORMAL SEC (ref 11.5–14.2)
RBC # BLD AUTO: 3.04 M/UL (ref 4.21–5.77)
RBC # BLD AUTO: 3.17 M/UL (ref 4.21–5.77)
RBC # BLD AUTO: 3.21 M/UL (ref 4.21–5.77)
RBC # BLD AUTO: 3.22 M/UL (ref 4.21–5.77)
RBC # BLD AUTO: 3.28 M/UL (ref 4.21–5.77)
RBC # BLD AUTO: 3.31 M/UL (ref 4.21–5.77)
RBC # BLD AUTO: 3.36 M/UL (ref 4.21–5.77)
RBC # BLD AUTO: 3.38 M/UL (ref 4.21–5.77)
RBC # BLD AUTO: 3.58 M/UL (ref 4.21–5.77)
RBC # BLD: ABNORMAL 10*6/UL
RBC # BLD: ABNORMAL 10*6/UL
REASON FOR REJECTION: NORMAL
SAMPLE SITE: ABNORMAL
SAMPLE SITE: ABNORMAL
SARS-COV-2 RNA RESP QL NAA+PROBE: NOT DETECTED
SODIUM SERPL-SCNC: 139 MMOL/L (ref 135–144)
SODIUM SERPL-SCNC: 142 MMOL/L (ref 135–144)
SODIUM SERPL-SCNC: 143 MMOL/L (ref 135–144)
SODIUM SERPL-SCNC: 144 MMOL/L (ref 135–144)
SODIUM SERPL-SCNC: 145 MMOL/L (ref 135–144)
SODIUM SERPL-SCNC: 147 MMOL/L (ref 135–144)
SODIUM SERPL-SCNC: 147 MMOL/L (ref 135–144)
SODIUM SERPL-SCNC: 148 MMOL/L (ref 135–144)
SODIUM SERPL-SCNC: 148 MMOL/L (ref 135–144)
SODIUM SERPL-SCNC: 149 MMOL/L (ref 135–144)
SODIUM SERPL-SCNC: 151 MMOL/L (ref 135–144)
SODIUM SERPL-SCNC: 151 MMOL/L (ref 135–144)
SODIUM SERPL-SCNC: 152 MMOL/L (ref 135–144)
SODIUM SERPL-SCNC: 153 MMOL/L (ref 135–144)
SOURCE: NORMAL
SPECIMEN DESCRIPTION: NORMAL
SPECIMEN SOURCE: NORMAL
TRIGL SERPL-MCNC: 91 MG/DL
TRIGL SERPL-MCNC: 98 MG/DL
TROPONIN I SERPL HS-MCNC: 29 NG/L (ref 0–22)
TROPONIN I SERPL HS-MCNC: 40 NG/L (ref 0–22)
WBC OTHER # BLD: 10.1 K/UL (ref 3.5–11.3)
WBC OTHER # BLD: 13.5 K/UL (ref 3.5–11.3)
WBC OTHER # BLD: 15 K/UL (ref 3.5–11.3)
WBC OTHER # BLD: 21.4 K/UL (ref 3.5–11.3)
WBC OTHER # BLD: 23.7 K/UL (ref 3.5–11.3)
WBC OTHER # BLD: 23.9 K/UL (ref 3.5–11.3)
WBC OTHER # BLD: 24.2 K/UL (ref 3.5–11.3)
WBC OTHER # BLD: 26.4 K/UL (ref 3.5–11.3)
WBC OTHER # BLD: 8.2 K/UL (ref 3.5–11.3)
ZZ NTE CLEAN UP: ORDERED TEST: NORMAL

## 2023-01-01 PROCEDURE — 85025 COMPLETE CBC W/AUTO DIFF WBC: CPT

## 2023-01-01 PROCEDURE — 2580000003 HC RX 258: Performed by: INTERNAL MEDICINE

## 2023-01-01 PROCEDURE — 82947 ASSAY GLUCOSE BLOOD QUANT: CPT

## 2023-01-01 PROCEDURE — 2580000003 HC RX 258: Performed by: NURSE PRACTITIONER

## 2023-01-01 PROCEDURE — 85730 THROMBOPLASTIN TIME PARTIAL: CPT

## 2023-01-01 PROCEDURE — C9113 INJ PANTOPRAZOLE SODIUM, VIA: HCPCS | Performed by: INTERNAL MEDICINE

## 2023-01-01 PROCEDURE — 6360000002 HC RX W HCPCS: Performed by: INTERNAL MEDICINE

## 2023-01-01 PROCEDURE — 80048 BASIC METABOLIC PNL TOTAL CA: CPT

## 2023-01-01 PROCEDURE — 6370000000 HC RX 637 (ALT 250 FOR IP): Performed by: NURSE PRACTITIONER

## 2023-01-01 PROCEDURE — 6360000002 HC RX W HCPCS: Performed by: FAMILY MEDICINE

## 2023-01-01 PROCEDURE — 94003 VENT MGMT INPAT SUBQ DAY: CPT

## 2023-01-01 PROCEDURE — A4216 STERILE WATER/SALINE, 10 ML: HCPCS | Performed by: INTERNAL MEDICINE

## 2023-01-01 PROCEDURE — 6360000002 HC RX W HCPCS: Performed by: NURSE PRACTITIONER

## 2023-01-01 PROCEDURE — 93306 TTE W/DOPPLER COMPLETE: CPT | Performed by: INTERNAL MEDICINE

## 2023-01-01 PROCEDURE — 6370000000 HC RX 637 (ALT 250 FOR IP): Performed by: INTERNAL MEDICINE

## 2023-01-01 PROCEDURE — 36415 COLL VENOUS BLD VENIPUNCTURE: CPT

## 2023-01-01 PROCEDURE — 5A1955Z RESPIRATORY VENTILATION, GREATER THAN 96 CONSECUTIVE HOURS: ICD-10-PCS | Performed by: INTERNAL MEDICINE

## 2023-01-01 PROCEDURE — 82803 BLOOD GASES ANY COMBINATION: CPT

## 2023-01-01 PROCEDURE — 2700000000 HC OXYGEN THERAPY PER DAY

## 2023-01-01 PROCEDURE — 94640 AIRWAY INHALATION TREATMENT: CPT

## 2023-01-01 PROCEDURE — 6360000004 HC RX CONTRAST MEDICATION: Performed by: INTERNAL MEDICINE

## 2023-01-01 PROCEDURE — 99232 SBSQ HOSP IP/OBS MODERATE 35: CPT | Performed by: INTERNAL MEDICINE

## 2023-01-01 PROCEDURE — 71045 X-RAY EXAM CHEST 1 VIEW: CPT

## 2023-01-01 PROCEDURE — 84132 ASSAY OF SERUM POTASSIUM: CPT

## 2023-01-01 PROCEDURE — 94761 N-INVAS EAR/PLS OXIMETRY MLT: CPT

## 2023-01-01 PROCEDURE — 83735 ASSAY OF MAGNESIUM: CPT

## 2023-01-01 PROCEDURE — 2500000003 HC RX 250 WO HCPCS: Performed by: INTERNAL MEDICINE

## 2023-01-01 PROCEDURE — 2500000003 HC RX 250 WO HCPCS: Performed by: NURSE PRACTITIONER

## 2023-01-01 PROCEDURE — 84478 ASSAY OF TRIGLYCERIDES: CPT

## 2023-01-01 PROCEDURE — 36620 INSERTION CATHETER ARTERY: CPT

## 2023-01-01 PROCEDURE — 2500000003 HC RX 250 WO HCPCS: Performed by: EMERGENCY MEDICINE

## 2023-01-01 PROCEDURE — 85027 COMPLETE CBC AUTOMATED: CPT

## 2023-01-01 PROCEDURE — 2000000000 HC ICU R&B

## 2023-01-01 PROCEDURE — 87636 SARSCOV2 & INF A&B AMP PRB: CPT

## 2023-01-01 PROCEDURE — 84295 ASSAY OF SERUM SODIUM: CPT

## 2023-01-01 PROCEDURE — 83880 ASSAY OF NATRIURETIC PEPTIDE: CPT

## 2023-01-01 PROCEDURE — 03HB33Z INSERTION OF INFUSION DEVICE INTO RIGHT RADIAL ARTERY, PERCUTANEOUS APPROACH: ICD-10-PCS | Performed by: FAMILY MEDICINE

## 2023-01-01 PROCEDURE — 99232 SBSQ HOSP IP/OBS MODERATE 35: CPT | Performed by: FAMILY MEDICINE

## 2023-01-01 PROCEDURE — 71260 CT THORAX DX C+: CPT

## 2023-01-01 PROCEDURE — 94660 CPAP INITIATION&MGMT: CPT

## 2023-01-01 PROCEDURE — 37799 UNLISTED PX VASCULAR SURGERY: CPT

## 2023-01-01 PROCEDURE — 93306 TTE W/DOPPLER COMPLETE: CPT

## 2023-01-01 PROCEDURE — 36600 WITHDRAWAL OF ARTERIAL BLOOD: CPT

## 2023-01-01 PROCEDURE — 93010 ELECTROCARDIOGRAM REPORT: CPT | Performed by: INTERNAL MEDICINE

## 2023-01-01 PROCEDURE — 99285 EMERGENCY DEPT VISIT HI MDM: CPT

## 2023-01-01 PROCEDURE — 0BH17EZ INSERTION OF ENDOTRACHEAL AIRWAY INTO TRACHEA, VIA NATURAL OR ARTIFICIAL OPENING: ICD-10-PCS | Performed by: NURSE PRACTITIONER

## 2023-01-01 PROCEDURE — 6370000000 HC RX 637 (ALT 250 FOR IP): Performed by: EMERGENCY MEDICINE

## 2023-01-01 PROCEDURE — 2060000000 HC ICU INTERMEDIATE R&B

## 2023-01-01 PROCEDURE — 5A09557 ASSISTANCE WITH RESPIRATORY VENTILATION, GREATER THAN 96 CONSECUTIVE HOURS, CONTINUOUS POSITIVE AIRWAY PRESSURE: ICD-10-PCS | Performed by: FAMILY MEDICINE

## 2023-01-01 PROCEDURE — 84484 ASSAY OF TROPONIN QUANT: CPT

## 2023-01-01 PROCEDURE — 99222 1ST HOSP IP/OBS MODERATE 55: CPT | Performed by: NURSE PRACTITIONER

## 2023-01-01 PROCEDURE — 84145 PROCALCITONIN (PCT): CPT

## 2023-01-01 PROCEDURE — 2580000003 HC RX 258: Performed by: FAMILY MEDICINE

## 2023-01-01 PROCEDURE — 93005 ELECTROCARDIOGRAM TRACING: CPT | Performed by: EMERGENCY MEDICINE

## 2023-01-01 PROCEDURE — 51702 INSERT TEMP BLADDER CATH: CPT

## 2023-01-01 PROCEDURE — 82374 ASSAY BLOOD CARBON DIOXIDE: CPT

## 2023-01-01 PROCEDURE — 85610 PROTHROMBIN TIME: CPT

## 2023-01-01 PROCEDURE — 6360000002 HC RX W HCPCS: Performed by: EMERGENCY MEDICINE

## 2023-01-01 PROCEDURE — 94002 VENT MGMT INPAT INIT DAY: CPT

## 2023-01-01 PROCEDURE — A4216 STERILE WATER/SALINE, 10 ML: HCPCS | Performed by: NURSE PRACTITIONER

## 2023-01-01 RX ORDER — QUETIAPINE FUMARATE 25 MG/1
25 TABLET, FILM COATED ORAL 2 TIMES DAILY
Status: DISCONTINUED | OUTPATIENT
Start: 2023-01-01 | End: 2023-01-01

## 2023-01-01 RX ORDER — PANTOPRAZOLE SODIUM 40 MG/10ML
40 INJECTION, POWDER, LYOPHILIZED, FOR SOLUTION INTRAVENOUS DAILY
Status: DISCONTINUED | OUTPATIENT
Start: 2023-01-01 | End: 2023-01-01

## 2023-01-01 RX ORDER — PROPOFOL 10 MG/ML
5-50 INJECTION, EMULSION INTRAVENOUS CONTINUOUS
Status: DISCONTINUED | OUTPATIENT
Start: 2023-01-01 | End: 2023-01-01

## 2023-01-01 RX ORDER — INSULIN LISPRO 100 [IU]/ML
0-8 INJECTION, SOLUTION INTRAVENOUS; SUBCUTANEOUS EVERY 6 HOURS
Status: DISCONTINUED | OUTPATIENT
Start: 2023-01-01 | End: 2023-01-01

## 2023-01-01 RX ORDER — SODIUM CHLORIDE 9 MG/ML
INJECTION, SOLUTION INTRAVENOUS CONTINUOUS
Status: DISCONTINUED | OUTPATIENT
Start: 2023-01-01 | End: 2023-01-01

## 2023-01-01 RX ORDER — IPRATROPIUM BROMIDE AND ALBUTEROL SULFATE 2.5; .5 MG/3ML; MG/3ML
1 SOLUTION RESPIRATORY (INHALATION)
Status: DISCONTINUED | OUTPATIENT
Start: 2023-01-01 | End: 2023-01-01

## 2023-01-01 RX ORDER — LORAZEPAM 2 MG/ML
1 INJECTION INTRAMUSCULAR EVERY 6 HOURS PRN
Status: DISCONTINUED | OUTPATIENT
Start: 2023-01-01 | End: 2023-01-01

## 2023-01-01 RX ORDER — ENOXAPARIN SODIUM 100 MG/ML
40 INJECTION SUBCUTANEOUS DAILY
Status: DISCONTINUED | OUTPATIENT
Start: 2023-01-01 | End: 2023-01-01

## 2023-01-01 RX ORDER — MORPHINE SULFATE 2 MG/ML
2 INJECTION, SOLUTION INTRAMUSCULAR; INTRAVENOUS EVERY 30 MIN PRN
Status: DISCONTINUED | OUTPATIENT
Start: 2023-01-01 | End: 2023-01-01 | Stop reason: HOSPADM

## 2023-01-01 RX ORDER — AZITHROMYCIN 250 MG/1
500 TABLET, FILM COATED ORAL DAILY
Status: COMPLETED | OUTPATIENT
Start: 2023-01-01 | End: 2023-01-01

## 2023-01-01 RX ORDER — ACETAMINOPHEN 650 MG/1
650 SUPPOSITORY RECTAL EVERY 6 HOURS PRN
Status: DISCONTINUED | OUTPATIENT
Start: 2023-01-01 | End: 2023-01-01

## 2023-01-01 RX ORDER — BUSPIRONE HYDROCHLORIDE 10 MG/1
10 TABLET ORAL 3 TIMES DAILY
Qty: 90 TABLET | Refills: 1 | Status: CANCELLED | OUTPATIENT
Start: 2023-01-01

## 2023-01-01 RX ORDER — SUCCINYLCHOLINE/SOD CL,ISO/PF 100 MG/5ML
120 SYRINGE (ML) INTRAVENOUS ONCE
Status: COMPLETED | OUTPATIENT
Start: 2023-01-01 | End: 2023-01-01

## 2023-01-01 RX ORDER — GUAIFENESIN 200 MG/10ML
400 LIQUID ORAL EVERY 6 HOURS
Status: DISCONTINUED | OUTPATIENT
Start: 2023-01-01 | End: 2023-01-01

## 2023-01-01 RX ORDER — ALBUTEROL SULFATE 2.5 MG/3ML
2.5 SOLUTION RESPIRATORY (INHALATION)
Status: DISCONTINUED | OUTPATIENT
Start: 2023-01-01 | End: 2023-01-01 | Stop reason: HOSPADM

## 2023-01-01 RX ORDER — SODIUM CHLORIDE 450 MG/100ML
INJECTION, SOLUTION INTRAVENOUS CONTINUOUS
Status: DISCONTINUED | OUTPATIENT
Start: 2023-01-01 | End: 2023-01-01 | Stop reason: HOSPADM

## 2023-01-01 RX ORDER — ROFLUMILAST 500 UG/1
500 TABLET ORAL DAILY
Status: DISCONTINUED | OUTPATIENT
Start: 2023-01-01 | End: 2023-01-01

## 2023-01-01 RX ORDER — INSULIN LISPRO 100 [IU]/ML
0-4 INJECTION, SOLUTION INTRAVENOUS; SUBCUTANEOUS NIGHTLY
Status: DISCONTINUED | OUTPATIENT
Start: 2023-01-01 | End: 2023-01-01 | Stop reason: SDUPTHER

## 2023-01-01 RX ORDER — ALBUTEROL SULFATE 2.5 MG/3ML
2.5 SOLUTION RESPIRATORY (INHALATION) EVERY 4 HOURS
Status: DISCONTINUED | OUTPATIENT
Start: 2023-01-01 | End: 2023-01-01

## 2023-01-01 RX ORDER — DEXTROSE MONOHYDRATE 100 MG/ML
INJECTION, SOLUTION INTRAVENOUS CONTINUOUS PRN
Status: DISCONTINUED | OUTPATIENT
Start: 2023-01-01 | End: 2023-01-01

## 2023-01-01 RX ORDER — 0.9 % SODIUM CHLORIDE 0.9 %
80 INTRAVENOUS SOLUTION INTRAVENOUS ONCE
Status: DISCONTINUED | OUTPATIENT
Start: 2023-01-01 | End: 2023-01-01 | Stop reason: HOSPADM

## 2023-01-01 RX ORDER — CISATRACURIUM BESYLATE 10 MG/ML
0.15 INJECTION, SOLUTION INTRAVENOUS ONCE
Status: DISCONTINUED | OUTPATIENT
Start: 2023-01-01 | End: 2023-01-01 | Stop reason: SDUPTHER

## 2023-01-01 RX ORDER — ONDANSETRON 4 MG/1
4 TABLET, ORALLY DISINTEGRATING ORAL EVERY 8 HOURS PRN
Status: DISCONTINUED | OUTPATIENT
Start: 2023-01-01 | End: 2023-01-01

## 2023-01-01 RX ORDER — IPRATROPIUM BROMIDE AND ALBUTEROL SULFATE 2.5; .5 MG/3ML; MG/3ML
1 SOLUTION RESPIRATORY (INHALATION) ONCE
Status: COMPLETED | OUTPATIENT
Start: 2023-01-01 | End: 2023-01-01

## 2023-01-01 RX ORDER — CISATRACURIUM BESYLATE 2 MG/ML
0.15 INJECTION, SOLUTION INTRAVENOUS ONCE
Status: DISCONTINUED | OUTPATIENT
Start: 2023-01-01 | End: 2023-01-01

## 2023-01-01 RX ORDER — BUDESONIDE 0.5 MG/2ML
0.5 INHALANT ORAL
Status: DISCONTINUED | OUTPATIENT
Start: 2023-01-01 | End: 2023-01-01

## 2023-01-01 RX ORDER — ALBUTEROL SULFATE 2.5 MG/3ML
2.5 SOLUTION RESPIRATORY (INHALATION)
Status: DISCONTINUED | OUTPATIENT
Start: 2023-01-01 | End: 2023-01-01

## 2023-01-01 RX ORDER — INSULIN LISPRO 100 [IU]/ML
0-4 INJECTION, SOLUTION INTRAVENOUS; SUBCUTANEOUS EVERY 6 HOURS
Status: DISCONTINUED | OUTPATIENT
Start: 2023-01-01 | End: 2023-01-01

## 2023-01-01 RX ORDER — BUSPIRONE HYDROCHLORIDE 5 MG/1
5 TABLET ORAL 2 TIMES DAILY
Status: DISCONTINUED | OUTPATIENT
Start: 2023-01-01 | End: 2023-01-01

## 2023-01-01 RX ORDER — LABETALOL HYDROCHLORIDE 5 MG/ML
20 INJECTION, SOLUTION INTRAVENOUS ONCE
Status: COMPLETED | OUTPATIENT
Start: 2023-01-01 | End: 2023-01-01

## 2023-01-01 RX ORDER — LORAZEPAM 2 MG/ML
0.5 INJECTION INTRAMUSCULAR EVERY 6 HOURS PRN
Status: DISCONTINUED | OUTPATIENT
Start: 2023-01-01 | End: 2023-01-01 | Stop reason: DRUGHIGH

## 2023-01-01 RX ORDER — DEXTROSE MONOHYDRATE 50 MG/ML
INJECTION, SOLUTION INTRAVENOUS CONTINUOUS
Status: DISCONTINUED | OUTPATIENT
Start: 2023-01-01 | End: 2023-01-01

## 2023-01-01 RX ORDER — METOCLOPRAMIDE HYDROCHLORIDE 5 MG/ML
5 INJECTION INTRAMUSCULAR; INTRAVENOUS EVERY 6 HOURS SCHEDULED
Status: DISCONTINUED | OUTPATIENT
Start: 2023-01-01 | End: 2023-01-01

## 2023-01-01 RX ORDER — HYDRALAZINE HYDROCHLORIDE 20 MG/ML
10 INJECTION INTRAMUSCULAR; INTRAVENOUS EVERY 6 HOURS PRN
Status: DISCONTINUED | OUTPATIENT
Start: 2023-01-01 | End: 2023-01-01

## 2023-01-01 RX ORDER — MORPHINE SULFATE 2 MG/ML
2 INJECTION, SOLUTION INTRAMUSCULAR; INTRAVENOUS
Status: DISCONTINUED | OUTPATIENT
Start: 2023-01-01 | End: 2023-01-01

## 2023-01-01 RX ORDER — SODIUM CHLORIDE 450 MG/100ML
INJECTION, SOLUTION INTRAVENOUS CONTINUOUS
Status: DISCONTINUED | OUTPATIENT
Start: 2023-01-01 | End: 2023-01-01

## 2023-01-01 RX ORDER — ETOMIDATE 2 MG/ML
20 INJECTION INTRAVENOUS ONCE
Status: COMPLETED | OUTPATIENT
Start: 2023-01-01 | End: 2023-01-01

## 2023-01-01 RX ORDER — FENTANYL CITRATE 0.05 MG/ML
25 INJECTION, SOLUTION INTRAMUSCULAR; INTRAVENOUS
Status: DISCONTINUED | OUTPATIENT
Start: 2023-01-01 | End: 2023-01-01

## 2023-01-01 RX ORDER — ACETAMINOPHEN 160 MG/5ML
1000 LIQUID ORAL EVERY 6 HOURS PRN
Status: DISCONTINUED | OUTPATIENT
Start: 2023-01-01 | End: 2023-01-01

## 2023-01-01 RX ORDER — SODIUM CHLORIDE 9 MG/ML
INJECTION, SOLUTION INTRAVENOUS PRN
Status: DISCONTINUED | OUTPATIENT
Start: 2023-01-01 | End: 2023-01-01 | Stop reason: HOSPADM

## 2023-01-01 RX ORDER — POLYETHYLENE GLYCOL 3350 17 G/17G
17 POWDER, FOR SOLUTION ORAL DAILY PRN
Status: DISCONTINUED | OUTPATIENT
Start: 2023-01-01 | End: 2023-01-01

## 2023-01-01 RX ORDER — ACETAMINOPHEN 325 MG/1
650 TABLET ORAL EVERY 6 HOURS PRN
Status: DISCONTINUED | OUTPATIENT
Start: 2023-01-01 | End: 2023-01-01

## 2023-01-01 RX ORDER — BUDESONIDE AND FORMOTEROL FUMARATE DIHYDRATE 160; 4.5 UG/1; UG/1
2 AEROSOL RESPIRATORY (INHALATION)
Status: DISCONTINUED | OUTPATIENT
Start: 2023-01-01 | End: 2023-01-01

## 2023-01-01 RX ORDER — SODIUM CHLORIDE 0.9 % (FLUSH) 0.9 %
5-40 SYRINGE (ML) INJECTION PRN
Status: DISCONTINUED | OUTPATIENT
Start: 2023-01-01 | End: 2023-01-01 | Stop reason: HOSPADM

## 2023-01-01 RX ORDER — PREDNISONE 20 MG/1
40 TABLET ORAL DAILY
Status: DISCONTINUED | OUTPATIENT
Start: 2023-01-01 | End: 2023-01-01

## 2023-01-01 RX ORDER — LORAZEPAM 2 MG/ML
1 INJECTION INTRAMUSCULAR
Status: DISCONTINUED | OUTPATIENT
Start: 2023-01-01 | End: 2023-01-01

## 2023-01-01 RX ORDER — GUAIFENESIN 600 MG/1
600 TABLET, EXTENDED RELEASE ORAL 2 TIMES DAILY
Status: DISCONTINUED | OUTPATIENT
Start: 2023-01-01 | End: 2023-01-01

## 2023-01-01 RX ORDER — FUROSEMIDE 10 MG/ML
40 INJECTION INTRAMUSCULAR; INTRAVENOUS ONCE
Status: COMPLETED | OUTPATIENT
Start: 2023-01-01 | End: 2023-01-01

## 2023-01-01 RX ORDER — BUSPIRONE HYDROCHLORIDE 5 MG/1
5 TABLET ORAL 2 TIMES DAILY
COMMUNITY
Start: 2023-01-01

## 2023-01-01 RX ORDER — SODIUM CHLORIDE 0.9 % (FLUSH) 0.9 %
10 SYRINGE (ML) INJECTION PRN
Status: DISCONTINUED | OUTPATIENT
Start: 2023-01-01 | End: 2023-01-01 | Stop reason: SDUPTHER

## 2023-01-01 RX ORDER — IPRATROPIUM BROMIDE AND ALBUTEROL SULFATE 2.5; .5 MG/3ML; MG/3ML
1 SOLUTION RESPIRATORY (INHALATION) EVERY 6 HOURS PRN
Status: DISCONTINUED | OUTPATIENT
Start: 2023-01-01 | End: 2023-01-01

## 2023-01-01 RX ORDER — ARFORMOTEROL TARTRATE 15 UG/2ML
15 SOLUTION RESPIRATORY (INHALATION)
Status: DISCONTINUED | OUTPATIENT
Start: 2023-01-01 | End: 2023-01-01

## 2023-01-01 RX ORDER — LORAZEPAM 2 MG/ML
1 INJECTION INTRAMUSCULAR
Status: DISCONTINUED | OUTPATIENT
Start: 2023-01-01 | End: 2023-01-01 | Stop reason: HOSPADM

## 2023-01-01 RX ORDER — NICOTINE 21 MG/24HR
1 PATCH, TRANSDERMAL 24 HOURS TRANSDERMAL DAILY
Status: DISCONTINUED | OUTPATIENT
Start: 2023-01-01 | End: 2023-01-01

## 2023-01-01 RX ORDER — CISATRACURIUM BESYLATE 2 MG/ML
0.15 INJECTION, SOLUTION INTRAVENOUS ONCE
Status: DISCONTINUED | OUTPATIENT
Start: 2023-01-01 | End: 2023-01-01 | Stop reason: SDUPTHER

## 2023-01-01 RX ORDER — POTASSIUM CHLORIDE 20 MEQ/1
40 TABLET, EXTENDED RELEASE ORAL ONCE
Status: COMPLETED | OUTPATIENT
Start: 2023-01-01 | End: 2023-01-01

## 2023-01-01 RX ORDER — ONDANSETRON 2 MG/ML
4 INJECTION INTRAMUSCULAR; INTRAVENOUS EVERY 6 HOURS PRN
Status: DISCONTINUED | OUTPATIENT
Start: 2023-01-01 | End: 2023-01-01

## 2023-01-01 RX ORDER — SODIUM CHLORIDE 0.9 % (FLUSH) 0.9 %
5-40 SYRINGE (ML) INJECTION EVERY 12 HOURS SCHEDULED
Status: DISCONTINUED | OUTPATIENT
Start: 2023-01-01 | End: 2023-01-01 | Stop reason: HOSPADM

## 2023-01-01 RX ADMIN — PROPOFOL 50 MCG/KG/MIN: 10 INJECTION, EMULSION INTRAVENOUS at 13:05

## 2023-01-01 RX ADMIN — ALBUTEROL SULFATE 2.5 MG: 2.5 SOLUTION RESPIRATORY (INHALATION) at 07:30

## 2023-01-01 RX ADMIN — METHYLPREDNISOLONE SODIUM SUCCINATE 60 MG: 125 INJECTION, POWDER, LYOPHILIZED, FOR SOLUTION INTRAMUSCULAR; INTRAVENOUS at 10:45

## 2023-01-01 RX ADMIN — BUDESONIDE INHALATION 500 MCG: 0.5 SUSPENSION RESPIRATORY (INHALATION) at 19:05

## 2023-01-01 RX ADMIN — PROPOFOL 50 MCG/KG/MIN: 10 INJECTION, EMULSION INTRAVENOUS at 00:28

## 2023-01-01 RX ADMIN — LORAZEPAM 1 MG: 2 INJECTION INTRAMUSCULAR; INTRAVENOUS at 00:43

## 2023-01-01 RX ADMIN — SERTRALINE 150 MG: 50 TABLET, FILM COATED ORAL at 08:08

## 2023-01-01 RX ADMIN — METHYLPREDNISOLONE SODIUM SUCCINATE 60 MG: 125 INJECTION, POWDER, LYOPHILIZED, FOR SOLUTION INTRAMUSCULAR; INTRAVENOUS at 02:06

## 2023-01-01 RX ADMIN — ARFORMOTEROL TARTRATE 15 MCG: 15 SOLUTION RESPIRATORY (INHALATION) at 07:38

## 2023-01-01 RX ADMIN — SERTRALINE 150 MG: 50 TABLET, FILM COATED ORAL at 11:46

## 2023-01-01 RX ADMIN — CEFTRIAXONE SODIUM 1000 MG: 1 INJECTION, POWDER, FOR SOLUTION INTRAMUSCULAR; INTRAVENOUS at 12:40

## 2023-01-01 RX ADMIN — BUSPIRONE HYDROCHLORIDE 5 MG: 5 TABLET ORAL at 09:49

## 2023-01-01 RX ADMIN — ARFORMOTEROL TARTRATE 15 MCG: 15 SOLUTION RESPIRATORY (INHALATION) at 07:34

## 2023-01-01 RX ADMIN — PROPOFOL 45 MCG/KG/MIN: 10 INJECTION, EMULSION INTRAVENOUS at 21:21

## 2023-01-01 RX ADMIN — DEXMEDETOMIDINE 0.2 MCG/KG/HR: 100 INJECTION, SOLUTION INTRAVENOUS at 22:24

## 2023-01-01 RX ADMIN — SODIUM CHLORIDE, PRESERVATIVE FREE 40 MG: 5 INJECTION INTRAVENOUS at 08:28

## 2023-01-01 RX ADMIN — METHYLPREDNISOLONE SODIUM SUCCINATE 60 MG: 125 INJECTION, POWDER, LYOPHILIZED, FOR SOLUTION INTRAMUSCULAR; INTRAVENOUS at 02:20

## 2023-01-01 RX ADMIN — BUDESONIDE INHALATION 500 MCG: 0.5 SUSPENSION RESPIRATORY (INHALATION) at 19:17

## 2023-01-01 RX ADMIN — CEFTRIAXONE SODIUM 1000 MG: 1 INJECTION, POWDER, FOR SOLUTION INTRAMUSCULAR; INTRAVENOUS at 13:08

## 2023-01-01 RX ADMIN — DEXTROSE MONOHYDRATE: 50 INJECTION, SOLUTION INTRAVENOUS at 03:43

## 2023-01-01 RX ADMIN — METHYLPREDNISOLONE SODIUM SUCCINATE 60 MG: 125 INJECTION, POWDER, LYOPHILIZED, FOR SOLUTION INTRAMUSCULAR; INTRAVENOUS at 01:34

## 2023-01-01 RX ADMIN — GUAIFENESIN 400 MG: 200 SOLUTION ORAL at 12:30

## 2023-01-01 RX ADMIN — DEXTROSE MONOHYDRATE: 50 INJECTION, SOLUTION INTRAVENOUS at 18:03

## 2023-01-01 RX ADMIN — SODIUM CHLORIDE, PRESERVATIVE FREE 10 ML: 5 INJECTION INTRAVENOUS at 08:11

## 2023-01-01 RX ADMIN — BUDESONIDE INHALATION 500 MCG: 0.5 SUSPENSION RESPIRATORY (INHALATION) at 19:02

## 2023-01-01 RX ADMIN — ALBUTEROL SULFATE 2.5 MG: 2.5 SOLUTION RESPIRATORY (INHALATION) at 15:21

## 2023-01-01 RX ADMIN — GUAIFENESIN 400 MG: 200 SOLUTION ORAL at 15:00

## 2023-01-01 RX ADMIN — GUAIFENESIN 400 MG: 200 SOLUTION ORAL at 08:07

## 2023-01-01 RX ADMIN — ROFLUMILAST 500 MCG: 500 TABLET ORAL at 09:15

## 2023-01-01 RX ADMIN — ARFORMOTEROL TARTRATE 15 MCG: 15 SOLUTION RESPIRATORY (INHALATION) at 19:17

## 2023-01-01 RX ADMIN — SODIUM CHLORIDE, PRESERVATIVE FREE 10 ML: 5 INJECTION INTRAVENOUS at 09:24

## 2023-01-01 RX ADMIN — ALBUTEROL SULFATE 2.5 MG: 2.5 SOLUTION RESPIRATORY (INHALATION) at 19:02

## 2023-01-01 RX ADMIN — PROPOFOL 50 MCG/KG/MIN: 10 INJECTION, EMULSION INTRAVENOUS at 14:02

## 2023-01-01 RX ADMIN — BUDESONIDE INHALATION 500 MCG: 0.5 SUSPENSION RESPIRATORY (INHALATION) at 07:41

## 2023-01-01 RX ADMIN — SODIUM CHLORIDE, PRESERVATIVE FREE 10 ML: 5 INJECTION INTRAVENOUS at 10:52

## 2023-01-01 RX ADMIN — SODIUM CHLORIDE, PRESERVATIVE FREE 40 MG: 5 INJECTION INTRAVENOUS at 09:22

## 2023-01-01 RX ADMIN — SERTRALINE 150 MG: 50 TABLET, FILM COATED ORAL at 09:22

## 2023-01-01 RX ADMIN — BUDESONIDE INHALATION 500 MCG: 0.5 SUSPENSION RESPIRATORY (INHALATION) at 19:08

## 2023-01-01 RX ADMIN — IPRATROPIUM BROMIDE AND ALBUTEROL SULFATE 1 DOSE: .5; 2.5 SOLUTION RESPIRATORY (INHALATION) at 23:10

## 2023-01-01 RX ADMIN — ALBUTEROL SULFATE 2.5 MG: 2.5 SOLUTION RESPIRATORY (INHALATION) at 19:53

## 2023-01-01 RX ADMIN — BUSPIRONE HYDROCHLORIDE 5 MG: 5 TABLET ORAL at 08:08

## 2023-01-01 RX ADMIN — PROPOFOL 50 MCG/KG/MIN: 10 INJECTION, EMULSION INTRAVENOUS at 15:36

## 2023-01-01 RX ADMIN — SODIUM CHLORIDE, PRESERVATIVE FREE 10 ML: 5 INJECTION INTRAVENOUS at 13:43

## 2023-01-01 RX ADMIN — PROPOFOL 35 MCG/KG/MIN: 10 INJECTION, EMULSION INTRAVENOUS at 23:27

## 2023-01-01 RX ADMIN — SERTRALINE 150 MG: 50 TABLET, FILM COATED ORAL at 10:37

## 2023-01-01 RX ADMIN — SODIUM CHLORIDE, PRESERVATIVE FREE 10 ML: 5 INJECTION INTRAVENOUS at 19:49

## 2023-01-01 RX ADMIN — PANTOPRAZOLE SODIUM 40 MG: 40 INJECTION, POWDER, FOR SOLUTION INTRAVENOUS at 13:42

## 2023-01-01 RX ADMIN — PROPOFOL 50 MCG/KG/MIN: 10 INJECTION, EMULSION INTRAVENOUS at 20:37

## 2023-01-01 RX ADMIN — GUAIFENESIN 400 MG: 200 SOLUTION ORAL at 20:43

## 2023-01-01 RX ADMIN — ARFORMOTEROL TARTRATE 15 MCG: 15 SOLUTION RESPIRATORY (INHALATION) at 07:16

## 2023-01-01 RX ADMIN — ALBUTEROL SULFATE 2.5 MG: 2.5 SOLUTION RESPIRATORY (INHALATION) at 11:03

## 2023-01-01 RX ADMIN — PROPOFOL 50 MCG/KG/MIN: 10 INJECTION, EMULSION INTRAVENOUS at 13:38

## 2023-01-01 RX ADMIN — ALBUTEROL SULFATE 2.5 MG: 2.5 SOLUTION RESPIRATORY (INHALATION) at 07:38

## 2023-01-01 RX ADMIN — PROPOFOL 50 MCG/KG/MIN: 10 INJECTION, EMULSION INTRAVENOUS at 04:35

## 2023-01-01 RX ADMIN — BUDESONIDE INHALATION 500 MCG: 0.5 SUSPENSION RESPIRATORY (INHALATION) at 19:16

## 2023-01-01 RX ADMIN — METHYLPREDNISOLONE SODIUM SUCCINATE 60 MG: 125 INJECTION, POWDER, LYOPHILIZED, FOR SOLUTION INTRAMUSCULAR; INTRAVENOUS at 09:21

## 2023-01-01 RX ADMIN — PROPOFOL 50 MCG/KG/MIN: 10 INJECTION, EMULSION INTRAVENOUS at 05:44

## 2023-01-01 RX ADMIN — BUSPIRONE HYDROCHLORIDE 5 MG: 5 TABLET ORAL at 09:22

## 2023-01-01 RX ADMIN — METHYLPREDNISOLONE SODIUM SUCCINATE 60 MG: 125 INJECTION, POWDER, LYOPHILIZED, FOR SOLUTION INTRAMUSCULAR; INTRAVENOUS at 08:27

## 2023-01-01 RX ADMIN — ALBUTEROL SULFATE 2.5 MG: 2.5 SOLUTION RESPIRATORY (INHALATION) at 10:26

## 2023-01-01 RX ADMIN — IPRATROPIUM BROMIDE AND ALBUTEROL SULFATE 1 DOSE: .5; 2.5 SOLUTION RESPIRATORY (INHALATION) at 14:59

## 2023-01-01 RX ADMIN — SODIUM CHLORIDE, PRESERVATIVE FREE 40 MG: 5 INJECTION INTRAVENOUS at 08:11

## 2023-01-01 RX ADMIN — BUSPIRONE HYDROCHLORIDE 5 MG: 5 TABLET ORAL at 20:21

## 2023-01-01 RX ADMIN — POLYETHYLENE GLYCOL 3350 17 G: 17 POWDER, FOR SOLUTION ORAL at 11:34

## 2023-01-01 RX ADMIN — ALBUTEROL SULFATE 2.5 MG: 2.5 SOLUTION RESPIRATORY (INHALATION) at 23:10

## 2023-01-01 RX ADMIN — ROFLUMILAST 500 MCG: 500 TABLET ORAL at 08:56

## 2023-01-01 RX ADMIN — BUDESONIDE INHALATION 500 MCG: 0.5 SUSPENSION RESPIRATORY (INHALATION) at 07:09

## 2023-01-01 RX ADMIN — PROPOFOL 50 MCG/KG/MIN: 10 INJECTION, EMULSION INTRAVENOUS at 04:43

## 2023-01-01 RX ADMIN — MORPHINE SULFATE 2 MG: 2 INJECTION, SOLUTION INTRAMUSCULAR; INTRAVENOUS at 13:08

## 2023-01-01 RX ADMIN — SODIUM CHLORIDE, PRESERVATIVE FREE 10 ML: 5 INJECTION INTRAVENOUS at 20:18

## 2023-01-01 RX ADMIN — DEXTROSE MONOHYDRATE: 50 INJECTION, SOLUTION INTRAVENOUS at 00:33

## 2023-01-01 RX ADMIN — GUAIFENESIN 600 MG: 600 TABLET ORAL at 13:48

## 2023-01-01 RX ADMIN — PROPOFOL 50 MCG/KG/MIN: 10 INJECTION, EMULSION INTRAVENOUS at 00:58

## 2023-01-01 RX ADMIN — METOCLOPRAMIDE HYDROCHLORIDE 5 MG: 5 INJECTION INTRAMUSCULAR; INTRAVENOUS at 10:46

## 2023-01-01 RX ADMIN — PROPOFOL 50 MCG/KG/MIN: 10 INJECTION, EMULSION INTRAVENOUS at 16:29

## 2023-01-01 RX ADMIN — INSULIN LISPRO 1 UNITS: 100 INJECTION, SOLUTION INTRAVENOUS; SUBCUTANEOUS at 12:04

## 2023-01-01 RX ADMIN — QUETIAPINE FUMARATE 25 MG: 25 TABLET ORAL at 19:58

## 2023-01-01 RX ADMIN — BUSPIRONE HYDROCHLORIDE 5 MG: 5 TABLET ORAL at 20:36

## 2023-01-01 RX ADMIN — GUAIFENESIN 400 MG: 200 SOLUTION ORAL at 00:47

## 2023-01-01 RX ADMIN — ROFLUMILAST 500 MCG: 500 TABLET ORAL at 08:27

## 2023-01-01 RX ADMIN — PROPOFOL 50 MCG/KG/MIN: 10 INJECTION, EMULSION INTRAVENOUS at 19:52

## 2023-01-01 RX ADMIN — SODIUM CHLORIDE, PRESERVATIVE FREE 10 ML: 5 INJECTION INTRAVENOUS at 13:27

## 2023-01-01 RX ADMIN — ALBUTEROL SULFATE 2.5 MG: 2.5 SOLUTION RESPIRATORY (INHALATION) at 15:00

## 2023-01-01 RX ADMIN — ROFLUMILAST 500 MCG: 500 TABLET ORAL at 10:37

## 2023-01-01 RX ADMIN — ARFORMOTEROL TARTRATE 15 MCG: 15 SOLUTION RESPIRATORY (INHALATION) at 08:20

## 2023-01-01 RX ADMIN — ROFLUMILAST 500 MCG: 500 TABLET ORAL at 13:34

## 2023-01-01 RX ADMIN — PROPOFOL 50 MCG/KG/MIN: 10 INJECTION, EMULSION INTRAVENOUS at 05:49

## 2023-01-01 RX ADMIN — ENOXAPARIN SODIUM 40 MG: 100 INJECTION SUBCUTANEOUS at 09:35

## 2023-01-01 RX ADMIN — ALBUTEROL SULFATE 2.5 MG: 2.5 SOLUTION RESPIRATORY (INHALATION) at 11:42

## 2023-01-01 RX ADMIN — METHYLPREDNISOLONE SODIUM SUCCINATE 60 MG: 125 INJECTION, POWDER, LYOPHILIZED, FOR SOLUTION INTRAMUSCULAR; INTRAVENOUS at 12:30

## 2023-01-01 RX ADMIN — METHYLPREDNISOLONE SODIUM SUCCINATE 60 MG: 125 INJECTION, POWDER, LYOPHILIZED, FOR SOLUTION INTRAMUSCULAR; INTRAVENOUS at 17:17

## 2023-01-01 RX ADMIN — GUAIFENESIN 400 MG: 200 SOLUTION ORAL at 09:00

## 2023-01-01 RX ADMIN — BUDESONIDE INHALATION 500 MCG: 0.5 SUSPENSION RESPIRATORY (INHALATION) at 07:22

## 2023-01-01 RX ADMIN — GUAIFENESIN 400 MG: 200 SOLUTION ORAL at 01:34

## 2023-01-01 RX ADMIN — GUAIFENESIN 400 MG: 200 SOLUTION ORAL at 19:44

## 2023-01-01 RX ADMIN — ALBUTEROL SULFATE 2.5 MG: 2.5 SOLUTION RESPIRATORY (INHALATION) at 07:22

## 2023-01-01 RX ADMIN — Medication 25 MCG/HR: at 18:42

## 2023-01-01 RX ADMIN — ALBUTEROL SULFATE 2.5 MG: 2.5 SOLUTION RESPIRATORY (INHALATION) at 15:22

## 2023-01-01 RX ADMIN — SODIUM CHLORIDE: 4.5 INJECTION, SOLUTION INTRAVENOUS at 23:00

## 2023-01-01 RX ADMIN — PREDNISONE 40 MG: 20 TABLET ORAL at 13:13

## 2023-01-01 RX ADMIN — ALBUTEROL SULFATE 2.5 MG: 2.5 SOLUTION RESPIRATORY (INHALATION) at 03:10

## 2023-01-01 RX ADMIN — METHYLPREDNISOLONE SODIUM SUCCINATE 60 MG: 125 INJECTION, POWDER, LYOPHILIZED, FOR SOLUTION INTRAMUSCULAR; INTRAVENOUS at 18:16

## 2023-01-01 RX ADMIN — DEXTROSE MONOHYDRATE: 50 INJECTION, SOLUTION INTRAVENOUS at 13:42

## 2023-01-01 RX ADMIN — SODIUM CHLORIDE: 4.5 INJECTION, SOLUTION INTRAVENOUS at 08:15

## 2023-01-01 RX ADMIN — QUETIAPINE FUMARATE 25 MG: 25 TABLET ORAL at 19:43

## 2023-01-01 RX ADMIN — PROPOFOL 50 MCG/KG/MIN: 10 INJECTION, EMULSION INTRAVENOUS at 03:31

## 2023-01-01 RX ADMIN — PROPOFOL 50 MCG/KG/MIN: 10 INJECTION, EMULSION INTRAVENOUS at 23:12

## 2023-01-01 RX ADMIN — BUDESONIDE INHALATION 500 MCG: 0.5 SUSPENSION RESPIRATORY (INHALATION) at 07:20

## 2023-01-01 RX ADMIN — PROPOFOL 25 MCG/KG/MIN: 10 INJECTION, EMULSION INTRAVENOUS at 10:48

## 2023-01-01 RX ADMIN — ROFLUMILAST 500 MCG: 500 TABLET ORAL at 09:49

## 2023-01-01 RX ADMIN — SODIUM CHLORIDE, PRESERVATIVE FREE 10 ML: 5 INJECTION INTRAVENOUS at 19:44

## 2023-01-01 RX ADMIN — PROPOFOL 50 MCG/KG/MIN: 10 INJECTION, EMULSION INTRAVENOUS at 10:09

## 2023-01-01 RX ADMIN — BUSPIRONE HYDROCHLORIDE 5 MG: 5 TABLET ORAL at 08:18

## 2023-01-01 RX ADMIN — BUSPIRONE HYDROCHLORIDE 5 MG: 5 TABLET ORAL at 21:24

## 2023-01-01 RX ADMIN — SERTRALINE 150 MG: 50 TABLET, FILM COATED ORAL at 08:55

## 2023-01-01 RX ADMIN — GUAIFENESIN 400 MG: 200 SOLUTION ORAL at 01:05

## 2023-01-01 RX ADMIN — SODIUM CHLORIDE, PRESERVATIVE FREE 10 ML: 5 INJECTION INTRAVENOUS at 08:09

## 2023-01-01 RX ADMIN — ENOXAPARIN SODIUM 40 MG: 100 INJECTION SUBCUTANEOUS at 08:08

## 2023-01-01 RX ADMIN — ALBUTEROL SULFATE 2.5 MG: 2.5 SOLUTION RESPIRATORY (INHALATION) at 19:08

## 2023-01-01 RX ADMIN — BUSPIRONE HYDROCHLORIDE 5 MG: 5 TABLET ORAL at 19:58

## 2023-01-01 RX ADMIN — PROPOFOL 35 MCG/KG/MIN: 10 INJECTION, EMULSION INTRAVENOUS at 15:46

## 2023-01-01 RX ADMIN — QUETIAPINE FUMARATE 25 MG: 25 TABLET ORAL at 19:53

## 2023-01-01 RX ADMIN — IPRATROPIUM BROMIDE AND ALBUTEROL SULFATE 1 DOSE: .5; 2.5 SOLUTION RESPIRATORY (INHALATION) at 19:06

## 2023-01-01 RX ADMIN — LORAZEPAM 1 MG: 2 INJECTION INTRAMUSCULAR; INTRAVENOUS at 13:07

## 2023-01-01 RX ADMIN — ALBUTEROL SULFATE 2.5 MG: 2.5 SOLUTION RESPIRATORY (INHALATION) at 11:04

## 2023-01-01 RX ADMIN — ROFLUMILAST 500 MCG: 500 TABLET ORAL at 11:46

## 2023-01-01 RX ADMIN — ARFORMOTEROL TARTRATE 15 MCG: 15 SOLUTION RESPIRATORY (INHALATION) at 07:09

## 2023-01-01 RX ADMIN — METHYLPREDNISOLONE SODIUM SUCCINATE 60 MG: 125 INJECTION, POWDER, LYOPHILIZED, FOR SOLUTION INTRAMUSCULAR; INTRAVENOUS at 08:11

## 2023-01-01 RX ADMIN — METHYLPREDNISOLONE SODIUM SUCCINATE 60 MG: 125 INJECTION, POWDER, LYOPHILIZED, FOR SOLUTION INTRAMUSCULAR; INTRAVENOUS at 00:48

## 2023-01-01 RX ADMIN — SERTRALINE 150 MG: 50 TABLET, FILM COATED ORAL at 09:49

## 2023-01-01 RX ADMIN — BUSPIRONE HYDROCHLORIDE 5 MG: 5 TABLET ORAL at 20:43

## 2023-01-01 RX ADMIN — ALBUTEROL SULFATE 2.5 MG: 2.5 SOLUTION RESPIRATORY (INHALATION) at 19:17

## 2023-01-01 RX ADMIN — PROPOFOL 50 MCG/KG/MIN: 10 INJECTION, EMULSION INTRAVENOUS at 00:54

## 2023-01-01 RX ADMIN — WATER 40 MG: 1 INJECTION INTRAMUSCULAR; INTRAVENOUS; SUBCUTANEOUS at 06:18

## 2023-01-01 RX ADMIN — SODIUM CHLORIDE, PRESERVATIVE FREE 10 ML: 5 INJECTION INTRAVENOUS at 20:21

## 2023-01-01 RX ADMIN — SODIUM CHLORIDE, PRESERVATIVE FREE 10 ML: 5 INJECTION INTRAVENOUS at 08:48

## 2023-01-01 RX ADMIN — ALBUTEROL SULFATE 2.5 MG: 2.5 SOLUTION RESPIRATORY (INHALATION) at 14:51

## 2023-01-01 RX ADMIN — BUSPIRONE HYDROCHLORIDE 5 MG: 5 TABLET ORAL at 19:43

## 2023-01-01 RX ADMIN — ENOXAPARIN SODIUM 40 MG: 100 INJECTION SUBCUTANEOUS at 08:07

## 2023-01-01 RX ADMIN — PROPOFOL 50 MCG/KG/MIN: 10 INJECTION, EMULSION INTRAVENOUS at 04:17

## 2023-01-01 RX ADMIN — METHYLPREDNISOLONE SODIUM SUCCINATE 60 MG: 125 INJECTION, POWDER, LYOPHILIZED, FOR SOLUTION INTRAMUSCULAR; INTRAVENOUS at 08:52

## 2023-01-01 RX ADMIN — SODIUM CHLORIDE 10 ML/HR: 9 INJECTION, SOLUTION INTRAVENOUS at 14:41

## 2023-01-01 RX ADMIN — IPRATROPIUM BROMIDE AND ALBUTEROL SULFATE 1 DOSE: .5; 2.5 SOLUTION RESPIRATORY (INHALATION) at 11:19

## 2023-01-01 RX ADMIN — ROFLUMILAST 500 MCG: 500 TABLET ORAL at 08:08

## 2023-01-01 RX ADMIN — ENOXAPARIN SODIUM 40 MG: 100 INJECTION SUBCUTANEOUS at 10:37

## 2023-01-01 RX ADMIN — WATER 40 MG: 1 INJECTION INTRAMUSCULAR; INTRAVENOUS; SUBCUTANEOUS at 17:53

## 2023-01-01 RX ADMIN — ALBUTEROL SULFATE 2.5 MG: 2.5 SOLUTION RESPIRATORY (INHALATION) at 07:16

## 2023-01-01 RX ADMIN — SODIUM CHLORIDE: 9 INJECTION, SOLUTION INTRAVENOUS at 01:10

## 2023-01-01 RX ADMIN — WATER 40 MG: 1 INJECTION INTRAMUSCULAR; INTRAVENOUS; SUBCUTANEOUS at 06:15

## 2023-01-01 RX ADMIN — ARFORMOTEROL TARTRATE 15 MCG: 15 SOLUTION RESPIRATORY (INHALATION) at 19:05

## 2023-01-01 RX ADMIN — GUAIFENESIN 600 MG: 600 TABLET ORAL at 21:24

## 2023-01-01 RX ADMIN — PROPOFOL 50 MCG/KG/MIN: 10 INJECTION, EMULSION INTRAVENOUS at 23:00

## 2023-01-01 RX ADMIN — ALBUTEROL SULFATE 2.5 MG: 2.5 SOLUTION RESPIRATORY (INHALATION) at 11:51

## 2023-01-01 RX ADMIN — ENOXAPARIN SODIUM 40 MG: 100 INJECTION SUBCUTANEOUS at 17:53

## 2023-01-01 RX ADMIN — MORPHINE SULFATE 2 MG: 2 INJECTION, SOLUTION INTRAMUSCULAR; INTRAVENOUS at 14:39

## 2023-01-01 RX ADMIN — PROPOFOL 50 MCG/KG/MIN: 10 INJECTION, EMULSION INTRAVENOUS at 04:12

## 2023-01-01 RX ADMIN — PROPOFOL 50 MCG/KG/MIN: 10 INJECTION, EMULSION INTRAVENOUS at 20:04

## 2023-01-01 RX ADMIN — ARFORMOTEROL TARTRATE 15 MCG: 15 SOLUTION RESPIRATORY (INHALATION) at 19:03

## 2023-01-01 RX ADMIN — INSULIN LISPRO 1 UNITS: 100 INJECTION, SOLUTION INTRAVENOUS; SUBCUTANEOUS at 23:39

## 2023-01-01 RX ADMIN — Medication 80 ML: at 13:43

## 2023-01-01 RX ADMIN — SODIUM CHLORIDE, PRESERVATIVE FREE 40 MG: 5 INJECTION INTRAVENOUS at 09:57

## 2023-01-01 RX ADMIN — PROPOFOL 50 MCG/KG/MIN: 10 INJECTION, EMULSION INTRAVENOUS at 01:07

## 2023-01-01 RX ADMIN — QUETIAPINE FUMARATE 25 MG: 25 TABLET ORAL at 20:21

## 2023-01-01 RX ADMIN — QUETIAPINE FUMARATE 25 MG: 25 TABLET ORAL at 08:08

## 2023-01-01 RX ADMIN — Medication 50 MCG/HR: at 12:04

## 2023-01-01 RX ADMIN — ALBUTEROL SULFATE 2.5 MG: 2.5 SOLUTION RESPIRATORY (INHALATION) at 07:19

## 2023-01-01 RX ADMIN — FENTANYL CITRATE 25 MCG: 0.05 INJECTION, SOLUTION INTRAMUSCULAR; INTRAVENOUS at 19:53

## 2023-01-01 RX ADMIN — BUSPIRONE HYDROCHLORIDE 5 MG: 5 TABLET ORAL at 10:37

## 2023-01-01 RX ADMIN — BUDESONIDE INHALATION 500 MCG: 0.5 SUSPENSION RESPIRATORY (INHALATION) at 19:06

## 2023-01-01 RX ADMIN — PROPOFOL 30 MCG/KG/MIN: 10 INJECTION, EMULSION INTRAVENOUS at 10:40

## 2023-01-01 RX ADMIN — BUDESONIDE INHALATION 500 MCG: 0.5 SUSPENSION RESPIRATORY (INHALATION) at 19:04

## 2023-01-01 RX ADMIN — SODIUM CHLORIDE, PRESERVATIVE FREE 10 ML: 5 INJECTION INTRAVENOUS at 20:44

## 2023-01-01 RX ADMIN — WATER 40 MG: 1 INJECTION INTRAMUSCULAR; INTRAVENOUS; SUBCUTANEOUS at 01:01

## 2023-01-01 RX ADMIN — Medication 120 MG: at 01:16

## 2023-01-01 RX ADMIN — METHYLPREDNISOLONE SODIUM SUCCINATE 60 MG: 125 INJECTION, POWDER, LYOPHILIZED, FOR SOLUTION INTRAMUSCULAR; INTRAVENOUS at 00:53

## 2023-01-01 RX ADMIN — HYDRALAZINE HYDROCHLORIDE 10 MG: 20 INJECTION, SOLUTION INTRAMUSCULAR; INTRAVENOUS at 20:34

## 2023-01-01 RX ADMIN — Medication 50 MCG/HR: at 18:50

## 2023-01-01 RX ADMIN — HYDRALAZINE HYDROCHLORIDE 10 MG: 20 INJECTION, SOLUTION INTRAMUSCULAR; INTRAVENOUS at 03:09

## 2023-01-01 RX ADMIN — ALBUTEROL SULFATE 2.5 MG: 2.5 SOLUTION RESPIRATORY (INHALATION) at 07:52

## 2023-01-01 RX ADMIN — PROPOFOL 50 MCG/KG/MIN: 10 INJECTION, EMULSION INTRAVENOUS at 09:20

## 2023-01-01 RX ADMIN — CEFTRIAXONE SODIUM 1000 MG: 1 INJECTION, POWDER, FOR SOLUTION INTRAMUSCULAR; INTRAVENOUS at 13:46

## 2023-01-01 RX ADMIN — METHYLPREDNISOLONE SODIUM SUCCINATE 60 MG: 125 INJECTION, POWDER, LYOPHILIZED, FOR SOLUTION INTRAMUSCULAR; INTRAVENOUS at 17:53

## 2023-01-01 RX ADMIN — INSULIN LISPRO 2 UNITS: 100 INJECTION, SOLUTION INTRAVENOUS; SUBCUTANEOUS at 18:01

## 2023-01-01 RX ADMIN — PROPOFOL 35 MCG/KG/MIN: 10 INJECTION, EMULSION INTRAVENOUS at 10:47

## 2023-01-01 RX ADMIN — ALBUTEROL SULFATE 2.5 MG: 2.5 SOLUTION RESPIRATORY (INHALATION) at 15:01

## 2023-01-01 RX ADMIN — QUETIAPINE FUMARATE 25 MG: 25 TABLET ORAL at 08:27

## 2023-01-01 RX ADMIN — SODIUM CHLORIDE, PRESERVATIVE FREE 10 ML: 5 INJECTION INTRAVENOUS at 19:53

## 2023-01-01 RX ADMIN — DEXTROSE MONOHYDRATE: 50 INJECTION, SOLUTION INTRAVENOUS at 08:48

## 2023-01-01 RX ADMIN — ENOXAPARIN SODIUM 40 MG: 100 INJECTION SUBCUTANEOUS at 08:57

## 2023-01-01 RX ADMIN — IPRATROPIUM BROMIDE AND ALBUTEROL SULFATE 1 DOSE: .5; 2.5 SOLUTION RESPIRATORY (INHALATION) at 10:15

## 2023-01-01 RX ADMIN — SODIUM CHLORIDE, PRESERVATIVE FREE 40 MG: 5 INJECTION INTRAVENOUS at 08:50

## 2023-01-01 RX ADMIN — HYDRALAZINE HYDROCHLORIDE 10 MG: 20 INJECTION, SOLUTION INTRAMUSCULAR; INTRAVENOUS at 19:17

## 2023-01-01 RX ADMIN — SODIUM CHLORIDE: 9 INJECTION, SOLUTION INTRAVENOUS at 01:45

## 2023-01-01 RX ADMIN — BUSPIRONE HYDROCHLORIDE 5 MG: 5 TABLET ORAL at 19:53

## 2023-01-01 RX ADMIN — BUDESONIDE AND FORMOTEROL FUMARATE DIHYDRATE 2 PUFF: 160; 4.5 AEROSOL RESPIRATORY (INHALATION) at 23:16

## 2023-01-01 RX ADMIN — BUSPIRONE HYDROCHLORIDE 5 MG: 5 TABLET ORAL at 11:46

## 2023-01-01 RX ADMIN — ALBUTEROL SULFATE 2.5 MG: 2.5 SOLUTION RESPIRATORY (INHALATION) at 14:59

## 2023-01-01 RX ADMIN — QUETIAPINE FUMARATE 25 MG: 25 TABLET ORAL at 10:37

## 2023-01-01 RX ADMIN — SERTRALINE 150 MG: 50 TABLET, FILM COATED ORAL at 08:27

## 2023-01-01 RX ADMIN — POTASSIUM CHLORIDE 40 MEQ: 1500 TABLET, EXTENDED RELEASE ORAL at 13:34

## 2023-01-01 RX ADMIN — CEFTRIAXONE SODIUM 1000 MG: 1 INJECTION, POWDER, FOR SOLUTION INTRAMUSCULAR; INTRAVENOUS at 13:19

## 2023-01-01 RX ADMIN — GUAIFENESIN 400 MG: 200 SOLUTION ORAL at 00:53

## 2023-01-01 RX ADMIN — PROPOFOL 10 MCG/KG/MIN: 10 INJECTION, EMULSION INTRAVENOUS at 01:28

## 2023-01-01 RX ADMIN — ARFORMOTEROL TARTRATE 15 MCG: 15 SOLUTION RESPIRATORY (INHALATION) at 07:20

## 2023-01-01 RX ADMIN — PROPOFOL 50 MCG/KG/MIN: 10 INJECTION, EMULSION INTRAVENOUS at 18:04

## 2023-01-01 RX ADMIN — IPRATROPIUM BROMIDE AND ALBUTEROL SULFATE 1 DOSE: .5; 2.5 SOLUTION RESPIRATORY (INHALATION) at 07:41

## 2023-01-01 RX ADMIN — FENTANYL CITRATE 25 MCG: 0.05 INJECTION, SOLUTION INTRAMUSCULAR; INTRAVENOUS at 01:37

## 2023-01-01 RX ADMIN — ENOXAPARIN SODIUM 40 MG: 100 INJECTION SUBCUTANEOUS at 11:42

## 2023-01-01 RX ADMIN — ALBUTEROL SULFATE 2.5 MG: 2.5 SOLUTION RESPIRATORY (INHALATION) at 19:05

## 2023-01-01 RX ADMIN — QUETIAPINE FUMARATE 25 MG: 25 TABLET ORAL at 20:36

## 2023-01-01 RX ADMIN — PROPOFOL 50 MCG/KG/MIN: 10 INJECTION, EMULSION INTRAVENOUS at 10:41

## 2023-01-01 RX ADMIN — ENOXAPARIN SODIUM 40 MG: 100 INJECTION SUBCUTANEOUS at 09:22

## 2023-01-01 RX ADMIN — SODIUM CHLORIDE 75 ML/HR: 9 INJECTION, SOLUTION INTRAVENOUS at 15:33

## 2023-01-01 RX ADMIN — PROPOFOL 50 MCG/KG/MIN: 10 INJECTION, EMULSION INTRAVENOUS at 16:24

## 2023-01-01 RX ADMIN — PROPOFOL 45 MCG/KG/MIN: 10 INJECTION, EMULSION INTRAVENOUS at 22:53

## 2023-01-01 RX ADMIN — ALBUTEROL SULFATE 2.5 MG: 2.5 SOLUTION RESPIRATORY (INHALATION) at 08:25

## 2023-01-01 RX ADMIN — SODIUM CHLORIDE: 4.5 INJECTION, SOLUTION INTRAVENOUS at 10:38

## 2023-01-01 RX ADMIN — ALBUTEROL SULFATE 2.5 MG: 2.5 SOLUTION RESPIRATORY (INHALATION) at 16:15

## 2023-01-01 RX ADMIN — ROFLUMILAST 500 MCG: 500 TABLET ORAL at 09:22

## 2023-01-01 RX ADMIN — IPRATROPIUM BROMIDE AND ALBUTEROL SULFATE 1 DOSE: .5; 2.5 SOLUTION RESPIRATORY (INHALATION) at 03:27

## 2023-01-01 RX ADMIN — CEFTRIAXONE SODIUM 1000 MG: 1 INJECTION, POWDER, FOR SOLUTION INTRAMUSCULAR; INTRAVENOUS at 12:42

## 2023-01-01 RX ADMIN — PROPOFOL 50 MCG/KG/MIN: 10 INJECTION, EMULSION INTRAVENOUS at 21:22

## 2023-01-01 RX ADMIN — ARFORMOTEROL TARTRATE 15 MCG: 15 SOLUTION RESPIRATORY (INHALATION) at 07:46

## 2023-01-01 RX ADMIN — GUAIFENESIN 400 MG: 200 SOLUTION ORAL at 09:16

## 2023-01-01 RX ADMIN — PROPOFOL 50 MCG/KG/MIN: 10 INJECTION, EMULSION INTRAVENOUS at 08:27

## 2023-01-01 RX ADMIN — SODIUM CHLORIDE, PRESERVATIVE FREE 10 ML: 5 INJECTION INTRAVENOUS at 19:58

## 2023-01-01 RX ADMIN — INSULIN LISPRO 1 UNITS: 100 INJECTION, SOLUTION INTRAVENOUS; SUBCUTANEOUS at 11:28

## 2023-01-01 RX ADMIN — FUROSEMIDE 40 MG: 10 INJECTION, SOLUTION INTRAMUSCULAR; INTRAVENOUS at 16:10

## 2023-01-01 RX ADMIN — SODIUM CHLORIDE, PRESERVATIVE FREE 10 ML: 5 INJECTION INTRAVENOUS at 20:43

## 2023-01-01 RX ADMIN — SODIUM CHLORIDE, PRESERVATIVE FREE 10 ML: 5 INJECTION INTRAVENOUS at 09:15

## 2023-01-01 RX ADMIN — SODIUM CHLORIDE: 9 INJECTION, SOLUTION INTRAVENOUS at 12:04

## 2023-01-01 RX ADMIN — QUETIAPINE FUMARATE 25 MG: 25 TABLET ORAL at 09:22

## 2023-01-01 RX ADMIN — BUDESONIDE INHALATION 500 MCG: 0.5 SUSPENSION RESPIRATORY (INHALATION) at 19:53

## 2023-01-01 RX ADMIN — BUDESONIDE INHALATION 500 MCG: 0.5 SUSPENSION RESPIRATORY (INHALATION) at 07:52

## 2023-01-01 RX ADMIN — LABETALOL HYDROCHLORIDE 20 MG: 5 INJECTION, SOLUTION INTRAVENOUS at 21:09

## 2023-01-01 RX ADMIN — WATER 40 MG: 1 INJECTION INTRAMUSCULAR; INTRAVENOUS; SUBCUTANEOUS at 13:23

## 2023-01-01 RX ADMIN — IPRATROPIUM BROMIDE AND ALBUTEROL SULFATE 1 DOSE: .5; 2.5 SOLUTION RESPIRATORY (INHALATION) at 09:00

## 2023-01-01 RX ADMIN — ARFORMOTEROL TARTRATE 15 MCG: 15 SOLUTION RESPIRATORY (INHALATION) at 07:22

## 2023-01-01 RX ADMIN — ALBUTEROL SULFATE 2.5 MG: 2.5 SOLUTION RESPIRATORY (INHALATION) at 19:04

## 2023-01-01 RX ADMIN — GUAIFENESIN 400 MG: 200 SOLUTION ORAL at 16:25

## 2023-01-01 RX ADMIN — ALBUTEROL SULFATE 2.5 MG: 2.5 SOLUTION RESPIRATORY (INHALATION) at 19:41

## 2023-01-01 RX ADMIN — PROPOFOL 40 MCG/KG/MIN: 10 INJECTION, EMULSION INTRAVENOUS at 12:24

## 2023-01-01 RX ADMIN — AZITHROMYCIN DIHYDRATE 500 MG: 250 TABLET ORAL at 11:46

## 2023-01-01 RX ADMIN — ENOXAPARIN SODIUM 40 MG: 100 INJECTION SUBCUTANEOUS at 08:25

## 2023-01-01 RX ADMIN — CEFTRIAXONE SODIUM 1000 MG: 1 INJECTION, POWDER, FOR SOLUTION INTRAMUSCULAR; INTRAVENOUS at 13:53

## 2023-01-01 RX ADMIN — PROPOFOL 50 MCG/KG/MIN: 10 INJECTION, EMULSION INTRAVENOUS at 05:52

## 2023-01-01 RX ADMIN — CEFTRIAXONE SODIUM 1000 MG: 1 INJECTION, POWDER, FOR SOLUTION INTRAMUSCULAR; INTRAVENOUS at 14:43

## 2023-01-01 RX ADMIN — QUETIAPINE FUMARATE 25 MG: 25 TABLET ORAL at 20:19

## 2023-01-01 RX ADMIN — WATER 40 MG: 1 INJECTION INTRAMUSCULAR; INTRAVENOUS; SUBCUTANEOUS at 17:52

## 2023-01-01 RX ADMIN — ALBUTEROL SULFATE 2.5 MG: 2.5 SOLUTION RESPIRATORY (INHALATION) at 10:48

## 2023-01-01 RX ADMIN — PROPOFOL 45 MCG/KG/MIN: 10 INJECTION, EMULSION INTRAVENOUS at 14:35

## 2023-01-01 RX ADMIN — ARFORMOTEROL TARTRATE 15 MCG: 15 SOLUTION RESPIRATORY (INHALATION) at 19:53

## 2023-01-01 RX ADMIN — SERTRALINE 150 MG: 50 TABLET, FILM COATED ORAL at 09:15

## 2023-01-01 RX ADMIN — SODIUM CHLORIDE 100 ML/HR: 9 INJECTION, SOLUTION INTRAVENOUS at 13:43

## 2023-01-01 RX ADMIN — ARFORMOTEROL TARTRATE 15 MCG: 15 SOLUTION RESPIRATORY (INHALATION) at 19:15

## 2023-01-01 RX ADMIN — ARFORMOTEROL TARTRATE 15 MCG: 15 SOLUTION RESPIRATORY (INHALATION) at 19:04

## 2023-01-01 RX ADMIN — SODIUM CHLORIDE: 9 INJECTION, SOLUTION INTRAVENOUS at 04:18

## 2023-01-01 RX ADMIN — ARFORMOTEROL TARTRATE 15 MCG: 15 SOLUTION RESPIRATORY (INHALATION) at 19:06

## 2023-01-01 RX ADMIN — PROPOFOL 50 MCG/KG/MIN: 10 INJECTION, EMULSION INTRAVENOUS at 00:55

## 2023-01-01 RX ADMIN — ARFORMOTEROL TARTRATE 15 MCG: 15 SOLUTION RESPIRATORY (INHALATION) at 19:08

## 2023-01-01 RX ADMIN — Medication 50 MCG/HR: at 14:46

## 2023-01-01 RX ADMIN — GUAIFENESIN 400 MG: 200 SOLUTION ORAL at 20:20

## 2023-01-01 RX ADMIN — METHYLPREDNISOLONE SODIUM SUCCINATE 60 MG: 125 INJECTION, POWDER, LYOPHILIZED, FOR SOLUTION INTRAMUSCULAR; INTRAVENOUS at 00:37

## 2023-01-01 RX ADMIN — METHYLPREDNISOLONE SODIUM SUCCINATE 60 MG: 125 INJECTION, POWDER, LYOPHILIZED, FOR SOLUTION INTRAMUSCULAR; INTRAVENOUS at 18:02

## 2023-01-01 RX ADMIN — BUSPIRONE HYDROCHLORIDE 5 MG: 5 TABLET ORAL at 08:27

## 2023-01-01 RX ADMIN — BUSPIRONE HYDROCHLORIDE 5 MG: 5 TABLET ORAL at 09:15

## 2023-01-01 RX ADMIN — GUAIFENESIN 400 MG: 200 SOLUTION ORAL at 02:20

## 2023-01-01 RX ADMIN — ALBUTEROL SULFATE 2.5 MG: 2.5 SOLUTION RESPIRATORY (INHALATION) at 10:58

## 2023-01-01 RX ADMIN — METHYLPREDNISOLONE SODIUM SUCCINATE 60 MG: 125 INJECTION, POWDER, LYOPHILIZED, FOR SOLUTION INTRAMUSCULAR; INTRAVENOUS at 17:16

## 2023-01-01 RX ADMIN — BUDESONIDE INHALATION 500 MCG: 0.5 SUSPENSION RESPIRATORY (INHALATION) at 07:38

## 2023-01-01 RX ADMIN — SODIUM CHLORIDE, PRESERVATIVE FREE 10 ML: 5 INJECTION INTRAVENOUS at 11:43

## 2023-01-01 RX ADMIN — GUAIFENESIN 400 MG: 200 SOLUTION ORAL at 08:27

## 2023-01-01 RX ADMIN — IOPAMIDOL 75 ML: 755 INJECTION, SOLUTION INTRAVENOUS at 13:42

## 2023-01-01 RX ADMIN — AZITHROMYCIN DIHYDRATE 500 MG: 250 TABLET ORAL at 09:49

## 2023-01-01 RX ADMIN — SODIUM CHLORIDE: 9 INJECTION, SOLUTION INTRAVENOUS at 22:23

## 2023-01-01 RX ADMIN — Medication 50 MCG/HR: at 04:20

## 2023-01-01 RX ADMIN — FENTANYL CITRATE 25 MCG: 0.05 INJECTION, SOLUTION INTRAMUSCULAR; INTRAVENOUS at 03:35

## 2023-01-01 RX ADMIN — GUAIFENESIN 400 MG: 200 SOLUTION ORAL at 20:44

## 2023-01-01 RX ADMIN — METHYLPREDNISOLONE SODIUM SUCCINATE 60 MG: 125 INJECTION, POWDER, LYOPHILIZED, FOR SOLUTION INTRAMUSCULAR; INTRAVENOUS at 17:25

## 2023-01-01 RX ADMIN — SODIUM CHLORIDE, PRESERVATIVE FREE 40 MG: 5 INJECTION INTRAVENOUS at 08:08

## 2023-01-01 RX ADMIN — ALBUTEROL SULFATE 2.5 MG: 2.5 SOLUTION RESPIRATORY (INHALATION) at 07:09

## 2023-01-01 RX ADMIN — ALBUTEROL SULFATE 2.5 MG: 2.5 SOLUTION RESPIRATORY (INHALATION) at 19:15

## 2023-01-01 RX ADMIN — METHYLPREDNISOLONE SODIUM SUCCINATE 60 MG: 125 INJECTION, POWDER, LYOPHILIZED, FOR SOLUTION INTRAMUSCULAR; INTRAVENOUS at 10:33

## 2023-01-01 RX ADMIN — AZITHROMYCIN DIHYDRATE 500 MG: 250 TABLET ORAL at 13:34

## 2023-01-01 RX ADMIN — BUDESONIDE INHALATION 500 MCG: 0.5 SUSPENSION RESPIRATORY (INHALATION) at 08:25

## 2023-01-01 RX ADMIN — METHYLPREDNISOLONE SODIUM SUCCINATE 60 MG: 125 INJECTION, POWDER, LYOPHILIZED, FOR SOLUTION INTRAMUSCULAR; INTRAVENOUS at 01:05

## 2023-01-01 RX ADMIN — QUETIAPINE FUMARATE 25 MG: 25 TABLET ORAL at 08:55

## 2023-01-01 RX ADMIN — SODIUM CHLORIDE, PRESERVATIVE FREE 40 MG: 5 INJECTION INTRAVENOUS at 09:02

## 2023-01-01 RX ADMIN — BUDESONIDE INHALATION 500 MCG: 0.5 SUSPENSION RESPIRATORY (INHALATION) at 07:16

## 2023-01-01 RX ADMIN — LORAZEPAM 0.5 MG: 2 INJECTION INTRAMUSCULAR; INTRAVENOUS at 19:46

## 2023-01-01 RX ADMIN — PROPOFOL 50 MCG/KG/MIN: 10 INJECTION, EMULSION INTRAVENOUS at 05:46

## 2023-01-01 RX ADMIN — SODIUM CHLORIDE, PRESERVATIVE FREE 40 MG: 5 INJECTION INTRAVENOUS at 10:35

## 2023-01-01 RX ADMIN — ETOMIDATE INJECTION 20 MG: 2 SOLUTION INTRAVENOUS at 01:16

## 2023-01-01 RX ADMIN — PROPOFOL 50 MCG/KG/MIN: 10 INJECTION, EMULSION INTRAVENOUS at 18:10

## 2023-01-01 RX ADMIN — FENTANYL CITRATE 25 MCG: 0.05 INJECTION, SOLUTION INTRAMUSCULAR; INTRAVENOUS at 20:22

## 2023-01-01 RX ADMIN — ALBUTEROL SULFATE 2.5 MG: 2.5 SOLUTION RESPIRATORY (INHALATION) at 15:31

## 2023-01-01 RX ADMIN — WATER 40 MG: 1 INJECTION INTRAMUSCULAR; INTRAVENOUS; SUBCUTANEOUS at 13:33

## 2023-01-01 RX ADMIN — ENOXAPARIN SODIUM 40 MG: 100 INJECTION SUBCUTANEOUS at 08:19

## 2023-01-01 RX ADMIN — GUAIFENESIN 400 MG: 200 SOLUTION ORAL at 19:58

## 2023-01-01 RX ADMIN — PROPOFOL 50 MCG/KG/MIN: 10 INJECTION, EMULSION INTRAVENOUS at 19:54

## 2023-01-01 RX ADMIN — BUSPIRONE HYDROCHLORIDE 5 MG: 5 TABLET ORAL at 20:19

## 2023-01-01 RX ADMIN — PROPOFOL 50 MCG/KG/MIN: 10 INJECTION, EMULSION INTRAVENOUS at 14:12

## 2023-01-01 RX ADMIN — GUAIFENESIN 400 MG: 200 SOLUTION ORAL at 10:53

## 2023-01-01 ASSESSMENT — PULMONARY FUNCTION TESTS
PIF_VALUE: 23
PIF_VALUE: 21
PIF_VALUE: 24
PIF_VALUE: 24
PIF_VALUE: 22
PIF_VALUE: 43
PIF_VALUE: 23
PIF_VALUE: 27
PIF_VALUE: 31
PIF_VALUE: 23
PIF_VALUE: 27
PIF_VALUE: 23
PIF_VALUE: 29
PIF_VALUE: 22
PIF_VALUE: 26
PIF_VALUE: 22
PIF_VALUE: 24
PIF_VALUE: 33
PIF_VALUE: 23
PIF_VALUE: 24
PIF_VALUE: 25
PIF_VALUE: 18
PIF_VALUE: 25
PIF_VALUE: 21
PIF_VALUE: 31
PIF_VALUE: 36
PIF_VALUE: 32
PIF_VALUE: 24
PIF_VALUE: 34
PIF_VALUE: 31
PIF_VALUE: 23
PIF_VALUE: 23
PIF_VALUE: 25
PIF_VALUE: 31
PIF_VALUE: 21
PIF_VALUE: 24
PIF_VALUE: 22
PIF_VALUE: 20
PIF_VALUE: 28
PIF_VALUE: 30
PIF_VALUE: 34
PIF_VALUE: 19
PIF_VALUE: 23
PIF_VALUE: 27
PIF_VALUE: 25
PIF_VALUE: 34
PIF_VALUE: 18
PIF_VALUE: 25
PIF_VALUE: 31
PIF_VALUE: 26
PIF_VALUE: 23
PIF_VALUE: 23
PIF_VALUE: 25
PIF_VALUE: 22
PIF_VALUE: 22
PIF_VALUE: 24
PIF_VALUE: 23
PIF_VALUE: 28
PIF_VALUE: 22
PIF_VALUE: 20
PIF_VALUE: 23
PIF_VALUE: 31
PIF_VALUE: 23
PIF_VALUE: 23
PIF_VALUE: 20
PIF_VALUE: 20
PIF_VALUE: 22
PIF_VALUE: 21
PIF_VALUE: 20
PIF_VALUE: 23
PIF_VALUE: 24
PIF_VALUE: 21
PIF_VALUE: 22
PIF_VALUE: 25
PIF_VALUE: 30
PIF_VALUE: 23
PIF_VALUE: 20
PIF_VALUE: 21
PIF_VALUE: 30
PIF_VALUE: 29
PIF_VALUE: 21
PIF_VALUE: 21
PIF_VALUE: 24
PIF_VALUE: 25
PIF_VALUE: 23
PIF_VALUE: 20
PIF_VALUE: 21
PIF_VALUE: 20
PIF_VALUE: 21
PIF_VALUE: 21
PIF_VALUE: 24
PIF_VALUE: 22
PIF_VALUE: 31
PIF_VALUE: 24
PIF_VALUE: 20
PIF_VALUE: 25
PIF_VALUE: 25
PIF_VALUE: 23
PIF_VALUE: 25
PIF_VALUE: 35
PIF_VALUE: 23
PIF_VALUE: 41
PIF_VALUE: 25
PIF_VALUE: 29
PIF_VALUE: 20
PIF_VALUE: 31
PIF_VALUE: 32
PIF_VALUE: 35
PIF_VALUE: 19
PIF_VALUE: 23
PIF_VALUE: 24
PIF_VALUE: 22
PIF_VALUE: 26
PIF_VALUE: 18
PIF_VALUE: 22
PIF_VALUE: 22
PIF_VALUE: 24
PIF_VALUE: 32
PIF_VALUE: 29
PIF_VALUE: 24
PIF_VALUE: 22
PIF_VALUE: 22
PIF_VALUE: 35
PIF_VALUE: 22
PIF_VALUE: 32
PIF_VALUE: 26
PIF_VALUE: 29
PIF_VALUE: 26
PIF_VALUE: 24
PIF_VALUE: 27
PIF_VALUE: 24
PIF_VALUE: 22
PIF_VALUE: 25
PIF_VALUE: 21
PIF_VALUE: 27
PIF_VALUE: 25
PIF_VALUE: 27
PIF_VALUE: 31
PIF_VALUE: 21
PIF_VALUE: 18
PIF_VALUE: 22
PIF_VALUE: 26
PIF_VALUE: 25
PIF_VALUE: 33
PIF_VALUE: 21
PIF_VALUE: 25
PIF_VALUE: 25
PIF_VALUE: 33
PIF_VALUE: 22
PIF_VALUE: 25
PIF_VALUE: 21
PIF_VALUE: 24
PIF_VALUE: 23
PIF_VALUE: 25
PIF_VALUE: 29
PIF_VALUE: 25
PIF_VALUE: 29
PIF_VALUE: 20
PIF_VALUE: 22
PIF_VALUE: 32
PIF_VALUE: 21
PIF_VALUE: 23
PIF_VALUE: 24
PIF_VALUE: 22
PIF_VALUE: 23
PIF_VALUE: 23
PIF_VALUE: 28
PIF_VALUE: 22
PIF_VALUE: 22
PIF_VALUE: 32
PIF_VALUE: 34
PIF_VALUE: 23
PIF_VALUE: 30
PIF_VALUE: 23
PIF_VALUE: 20
PIF_VALUE: 34
PIF_VALUE: 25
PIF_VALUE: 27
PIF_VALUE: 20
PIF_VALUE: 27
PIF_VALUE: 23
PIF_VALUE: 23
PIF_VALUE: 24
PIF_VALUE: 20
PIF_VALUE: 23
PIF_VALUE: 21
PIF_VALUE: 28
PIF_VALUE: 25
PIF_VALUE: 23
PIF_VALUE: 23
PIF_VALUE: 29
PIF_VALUE: 25
PIF_VALUE: 35
PIF_VALUE: 15
PIF_VALUE: 23
PIF_VALUE: 25
PIF_VALUE: 28
PIF_VALUE: 20
PIF_VALUE: 26
PIF_VALUE: 25
PIF_VALUE: 23
PIF_VALUE: 24
PIF_VALUE: 24
PIF_VALUE: 47
PIF_VALUE: 22
PIF_VALUE: 29
PIF_VALUE: 29
PIF_VALUE: 22
PIF_VALUE: 26
PIF_VALUE: 25
PIF_VALUE: 22
PIF_VALUE: 23
PIF_VALUE: 23
PIF_VALUE: 28
PIF_VALUE: 33
PIF_VALUE: 36
PIF_VALUE: 24
PIF_VALUE: 22
PIF_VALUE: 20
PIF_VALUE: 24
PIF_VALUE: 22
PIF_VALUE: 21
PIF_VALUE: 20
PIF_VALUE: 23
PIF_VALUE: 20
PIF_VALUE: 24
PIF_VALUE: 27
PIF_VALUE: 22
PIF_VALUE: 23
PIF_VALUE: 24
PIF_VALUE: 20
PIF_VALUE: 26
PIF_VALUE: 30
PIF_VALUE: 25
PIF_VALUE: 30
PIF_VALUE: 34
PIF_VALUE: 34
PIF_VALUE: 26
PIF_VALUE: 22
PIF_VALUE: 21
PIF_VALUE: 33
PIF_VALUE: 20
PIF_VALUE: 22
PIF_VALUE: 38
PIF_VALUE: 24
PIF_VALUE: 34
PIF_VALUE: 32
PIF_VALUE: 21
PIF_VALUE: 27
PIF_VALUE: 21
PIF_VALUE: 19
PIF_VALUE: 30
PIF_VALUE: 19
PIF_VALUE: 23
PIF_VALUE: 25
PIF_VALUE: 19
PIF_VALUE: 22
PIF_VALUE: 25
PIF_VALUE: 26
PIF_VALUE: 23
PIF_VALUE: 27
PIF_VALUE: 27
PIF_VALUE: 29
PIF_VALUE: 30
PIF_VALUE: 21
PIF_VALUE: 27
PIF_VALUE: 34
PIF_VALUE: 28
PIF_VALUE: 22
PIF_VALUE: 27
PIF_VALUE: 22
PIF_VALUE: 21
PIF_VALUE: 27
PIF_VALUE: 23
PIF_VALUE: 25
PIF_VALUE: 25
PIF_VALUE: 24
PIF_VALUE: 26
PIF_VALUE: 19
PIF_VALUE: 20
PIF_VALUE: 20
PIF_VALUE: 24
PIF_VALUE: 25
PIF_VALUE: 20
PIF_VALUE: 31
PIF_VALUE: 23
PIF_VALUE: 20
PIF_VALUE: 26
PIF_VALUE: 24
PIF_VALUE: 21
PIF_VALUE: 23
PIF_VALUE: 22
PIF_VALUE: 38
PIF_VALUE: 21
PIF_VALUE: 22
PIF_VALUE: 19
PIF_VALUE: 25
PIF_VALUE: 26
PIF_VALUE: 21
PIF_VALUE: 30
PIF_VALUE: 26
PIF_VALUE: 27
PIF_VALUE: 24
PIF_VALUE: 24
PIF_VALUE: 22
PIF_VALUE: 28
PIF_VALUE: 20
PIF_VALUE: 36
PIF_VALUE: 21
PIF_VALUE: 26
PIF_VALUE: 24
PIF_VALUE: 24
PIF_VALUE: 23
PIF_VALUE: 28
PIF_VALUE: 20
PIF_VALUE: 22
PIF_VALUE: 21
PIF_VALUE: 22
PIF_VALUE: 24
PIF_VALUE: 24
PIF_VALUE: 25
PIF_VALUE: 29
PIF_VALUE: 19
PIF_VALUE: 29
PIF_VALUE: 28
PIF_VALUE: 22
PIF_VALUE: 22
PIF_VALUE: 20
PIF_VALUE: 24
PIF_VALUE: 27
PIF_VALUE: 23
PIF_VALUE: 22

## 2023-01-01 ASSESSMENT — PAIN SCALES - WONG BAKER
WONGBAKER_NUMERICALRESPONSE: 0

## 2023-01-01 ASSESSMENT — ENCOUNTER SYMPTOMS
COUGH: 1
ABDOMINAL DISTENTION: 0
EYE PAIN: 0
VOMITING: 0
ABDOMINAL PAIN: 0
WHEEZING: 1
CHEST TIGHTNESS: 0
CONSTIPATION: 0
BACK PAIN: 0
DIARRHEA: 0
SHORTNESS OF BREATH: 1
NAUSEA: 0
EYE DISCHARGE: 0
FACIAL SWELLING: 0

## 2023-01-01 ASSESSMENT — PAIN SCALES - GENERAL
PAINLEVEL_OUTOF10: 0

## 2023-01-01 ASSESSMENT — PAIN - FUNCTIONAL ASSESSMENT: PAIN_FUNCTIONAL_ASSESSMENT: NONE - DENIES PAIN

## 2023-06-04 PROBLEM — J93.83 SPONTANEOUS PNEUMOTHORAX: Status: ACTIVE | Noted: 2023-06-04

## 2023-06-04 PROBLEM — J44.9 COPD (CHRONIC OBSTRUCTIVE PULMONARY DISEASE) (HCC): Status: ACTIVE | Noted: 2018-06-19

## 2023-06-04 PROBLEM — I10 BENIGN ESSENTIAL HYPERTENSION: Status: ACTIVE | Noted: 2017-02-22

## 2023-06-05 PROBLEM — F17.200 SMOKER: Status: ACTIVE | Noted: 2023-06-05

## 2023-06-06 PROBLEM — J96.21 ACUTE ON CHRONIC RESPIRATORY FAILURE WITH HYPOXIA (HCC): Status: ACTIVE | Noted: 2023-06-06

## 2023-06-14 PROBLEM — T79.7XXA SUBCUTANEOUS EMPHYSEMA (HCC): Status: ACTIVE | Noted: 2023-06-14

## 2023-08-14 RX ORDER — BUSPIRONE HYDROCHLORIDE 10 MG/1
TABLET ORAL
Qty: 90 TABLET | Refills: 1 | OUTPATIENT
Start: 2023-08-14

## 2023-08-17 NOTE — TELEPHONE ENCOUNTER
Marcella Riley is calling to request a refill on the following medication(s):    Medication Request:  Requested Prescriptions     Pending Prescriptions Disp Refills    predniSONE (DELTASONE) 5 MG tablet [Pharmacy Med Name: predniSONE 5 MG TABLET] 30 tablet 0     Sig: TAKE 1 AND 1/2 TABLET BY MOUTH DAILY       Last Visit Date (If Applicable):  Visit date not found    Next Visit Date:    Visit date not found

## 2023-08-19 RX ORDER — PREDNISONE 5 MG/1
TABLET ORAL
Qty: 30 TABLET | Refills: 0 | OUTPATIENT
Start: 2023-08-19

## 2023-08-28 RX ORDER — PREDNISONE 5 MG/1
TABLET ORAL
Qty: 30 TABLET | Refills: 0 | OUTPATIENT
Start: 2023-08-28

## 2023-08-29 RX ORDER — PREDNISONE 5 MG/1
TABLET ORAL
Qty: 30 TABLET | Refills: 0 | OUTPATIENT
Start: 2023-08-29

## 2023-08-29 RX ORDER — BUSPIRONE HYDROCHLORIDE 10 MG/1
TABLET ORAL
Qty: 90 TABLET | Refills: 1 | OUTPATIENT
Start: 2023-08-29

## 2023-08-31 RX ORDER — BUSPIRONE HYDROCHLORIDE 10 MG/1
TABLET ORAL
Qty: 90 TABLET | Refills: 1 | OUTPATIENT
Start: 2023-08-31

## 2023-11-02 PROBLEM — J44.1 COPD EXACERBATION (HCC): Status: ACTIVE | Noted: 2023-01-01

## 2023-11-02 PROBLEM — R60.0 BILATERAL LEG EDEMA: Status: ACTIVE | Noted: 2023-01-01

## 2023-11-02 NOTE — PROGRESS NOTES
Transitions of Care Pharmacy Service   Medication Review    The patient's list of current home medications has been reviewed. Source(s) of information: spoke to patient and sure scripts     Based on information provided by the above source(s), I have updated the patient's home med list as described below. I changed or updated the following medications on the patient's home medication list:  Discontinued busPIRone (BUSPAR) 10 MG tablet     Added busPIRone (BUSPAR) 5 MG tablet     Adjusted   predniSONE (DELTASONE) 5 MG tablet  sertraline (ZOLOFT) 100 MG tablet     Other Notes None            Please feel free to call me with any questions about this encounter. Thank you. This note will be reviewed and co-signed by the [facility] Pharmacist. The pharmacist will review inpatient orders and contact the physician about any discrepancies.     Smitha Duncan, pharmacy technician  Transitions [facility] Pharmacy Service  Phone:  [phone]  Fax: [phone]      Electronically signed by Smitha Duncan on 11/2/2023 at 5:17 PM       Prior to Admission medications    Medication Sig   busPIRone (BUSPAR) 5 MG tablet Take 1 tablet by mouth in the morning and at bedtime   predniSONE (DELTASONE) 5 MG tablet Take 2 tablets by mouth daily   nicotine (NICODERM CQ) 7 MG/24HR Place 1 patch onto the skin daily   ipratropium 0.5 mg-albuterol 2.5 mg (DUONEB) 0.5-2.5 (3) MG/3ML SOLN nebulizer solution Inhale 3 mLs into the lungs every 6 hours as needed for Shortness of Breath   Budeson-Glycopyrrol-Formoterol (BREZTRI AEROSPHERE) 160-9-4.8 MCG/ACT AERO Inhale 2 puffs into the lungs 2 times daily   sertraline (ZOLOFT) 100 MG tablet Take 1.5 tablets by mouth daily   acetaminophen (TYLENOL) 500 MG tablet Take 500 mg by mouth every 6 hours as needed for Pain     albuterol sulfate  (90 Base) MCG/ACT inhaler Inhale 2 puffs into the lungs every 6 hours as needed for Wheezing

## 2023-11-02 NOTE — ED PROVIDER NOTES
EMERGENCY DEPARTMENT ENCOUNTER    Pt Name: Nyasia Payton  MRN: [id number]  [address] 1945  Date of evaluation: 11/2/23  CHIEF COMPLAINT       Chief Complaint   Patient presents with    Shortness of Breath     SOB pat 3 days, worse today, EMS administered Solumedrol, 2 breathing tx and duoneb PTA. HISTORY OF PRESENT ILLNESS   HPI   The patient is a 70-year-old male with a history of COPD on home oxygen who presented to the emergency department by EMS from home secondary to shortness of breath. EMS was called as patient is worsening shortness of breath for the last 3 days prehospital patient received Solu-Medrol and 3 breathing treatments. Patient has had increased cough and congestion. He denies use of blood thinners but is on chronic steroids. Not currently on antibiotics. He denies nausea, vomiting, fevers or chills. REVIEW OF SYSTEMS     Review of Systems   Constitutional:  Negative for chills, diaphoresis and fever. HENT:  Negative for congestion, ear pain and facial swelling. Eyes:  Negative for pain, discharge and visual disturbance. Respiratory:  Positive for cough, shortness of breath and wheezing. Negative for chest tightness. Cardiovascular:  Negative for chest pain and palpitations. Gastrointestinal:  Negative for abdominal distention and abdominal pain. Genitourinary:  Negative for difficulty urinating and flank pain. Musculoskeletal:  Negative for back pain. Skin:  Negative for wound. Neurological:  Negative for dizziness, light-headedness and headaches.      PASTMEDICAL HISTORY     Past Medical History:   Diagnosis Date    COPD (chronic obstructive pulmonary disease) ([facility])     Hyperlipidemia     Hypertension      Past Problem List  Patient Active Problem List   Diagnosis Code    Chronic obstructive pulmonary disease with acute exacerbation ([facility]) J44.1    Essential hypertension I10    Mixed hyperlipidemia E78.2    Benign essential hypertension I10    Hyperlipidemia Answer:   3 days    guaiFENesin (MUCINEX) extended release tablet 600 mg    FOLLOWED BY Linked Order Group     methylPREDNISolone sodium succ (SOLU-MEDROL) 40 mg in sterile water 1 mL injection     predniSONE (DELTASONE) tablet 40 mg    DISCONTD: albuterol (PROVENTIL) (2.5 MG/3ML) 0.083% nebulizer solution 2.5 mg     Order Specific Question:   Initiate RT Bronchodilator Protocol     Answer:   Yes - Inpatient Protocol    Roflumilast (DALIRESP) tablet 500 mcg    nicotine (NICODERM CQ) 14 MG/24HR 1 patch    potassium chloride (KLOR-CON M) extended release tablet 40 mEq    perflutren lipid microspheres (DEFINITY) injection 1.5 mL    budesonide-formoterol (SYMBICORT) 160-4.5 MCG/ACT inhaler 2 puff    tiotropium (SPIRIVA RESPIMAT) 2.5 MCG/ACT inhaler 2 puff    albuterol (PROVENTIL) (2.5 MG/3ML) 0.083% nebulizer solution 2.5 mg     Order Specific Question:   Initiate RT Bronchodilator Protocol     Answer:   Yes - Inpatient Protocol     DISCHARGE PRESCRIPTIONS:  Current Discharge Medication List        PHYSICIAN CONSULTS ORDERED THIS ENCOUNTER:  IP CONSULT TO INTERNAL MEDICINE  IP CONSULT TO PULMONOLOGY  IP CONSULT TO PALLIATIVE CARE  FINAL IMPRESSION      1. Bilateral leg edema    2. COPD exacerbation ([address])          DISPOSITION/PLAN   DISPOSITION Admitted 11/02/2023 11:12:54 AM      OUTPATIENT FOLLOW UP THE PATIENT:  No follow-up provider specified.     MD Bradley Bowden MD  90/26/14 3200

## 2023-11-02 NOTE — PROGRESS NOTES
Patient admitted to room 1020  VS taken, telemetry placed and call light given/within reach. Patient on 5L nasal cannula per baseline. Very anxious and tachypnic. Patient A&O and given room orientation. Orders released/reviewed, initial assessment completed and navigator started. See chart for more detail.

## 2023-11-02 NOTE — PLAN OF CARE
Patient arrived to unit at 1230 on 5L NC. Patient receiving IV Rocephin and PO Zithromax. Patient BP is 173/95 @ 1745 and has new order for PRN hydralazine. Stand by assist to commode. A/Ox4, safety maintained throughout shift. Problem: Discharge Planning  Goal: Discharge to home or other facility with appropriate resources  Outcome: Progressing     Problem: Skin/Tissue Integrity  Goal: Absence of new skin breakdown  Description: 1. Monitor for areas of redness and/or skin breakdown  2. Assess vascular access sites hourly  3. Every 4-6 hours minimum:  Change oxygen saturation probe site  4. Every 4-6 hours:  If on nasal continuous positive airway pressure, respiratory therapy assess nares and determine need for appliance change or resting period.   Outcome: Progressing     Problem: Safety - Adult  Goal: Free from fall injury  Outcome: Progressing     Problem: ABCDS Injury Assessment  Goal: Absence of physical injury  Outcome: Progressing

## 2023-11-02 NOTE — PLAN OF CARE
Problem: Respiratory - Adult  Goal: Clear lung sounds  Outcome: Progressing  Goal: Adequate oxygenation  Description: Adequate oxygenation  Outcome: Progressing

## 2023-11-02 NOTE — CARE COORDINATION
Case Management Assessment  Initial Evaluation    Date/Time of Evaluation: 11/2/2023 3:21 PM  Assessment Completed by: Sagar Ang RN    If patient is discharged prior to next notation, then this note serves as note for discharge by case management. Patient Name: Nydia Oglesby                   YOB: 1945  Diagnosis: COPD exacerbation [facility]) [J44.1]                   Date / Time: 11/2/2023  8:23 AM    Patient Admission Status: Inpatient   Readmission Risk (Low < 19, Mod (19-27), High > 27): Readmission Risk Score: 13.6    Current PCP: GIN Grace CNP  PCP verified by CM? (P) Yes    Chart Reviewed: Yes      History Provided by: (P) Patient  Patient Orientation: (P) Alert and Oriented    Patient Cognition: (P) Alert    Hospitalization in the last 30 days (Readmission):  No    If yes, Readmission Assessment in  Navigator will be completed.     Advance Directives:      Code Status: Full Code   Patient's Primary Decision Maker is: (P) Legal Next of Kin    Primary Decision Maker: Krissy Collins - Spouse - [phone]    Discharge Planning:    Patient lives with: (P) Spouse/Significant Other Type of Home: (P) House  Primary Care Giver: (P) Self  Patient Support Systems include: (P) Children   Current Financial resources: (P) Medicare  Current community resources: (P) None  Current services prior to admission: (P) Oxygen Therapy (Patients Oxygen Supplier [facility]  script verified: 2liters 24/7)            Current DME:              Type of Home Care services:  (P) OT, PT, Nursing Services, [employer]    ADLS  Prior functional level: (P) Assistance with the following:, Bathing, Dressing, Cooking, Housework, Shopping  Current functional level: (P) Cooking, Housework, Shopping, Dressing, Bathing, Assistance with the following:    PT AM-PAC:   /24  OT AM-PAC:   /24    Family can provide assistance at DC: (P) Yes  Would you like Case Management to discuss the discharge plan with any other family members/significant others, and if so, who? (P) Yes (we can speak with emergency contacts prn)  Plans to Return to Present Housing: (P) Yes  Other Identified Issues/Barriers to RETURNING to current housing: none  Potential Assistance needed at discharge: (P) [address], Outpatient PT/OT            Potential DME:    Patient expects to discharge to: (P) [address] [facility] for transportation at discharge: (P) Family    Financial    Payor: MEDICARE / Plan: MEDICARE PART A AND B / Product Type: *No Product type* /     Does insurance require precert for SNF: No    Potential assistance Purchasing Medications: (P) No  Meds-to-Beds request:        [address] [facility] [phone] - Araceli Manzano - [address] [address] [phone] [facility] [address]  Phone: [phone] Fax: [phone]      Notes:    Factors facilitating achievement of predicted outcomes: Family support    Barriers to discharge: Limited insight into deficits and Decreased endurance    Additional Case Management Notes:     11/2 Lives with spouse in two story home, daughter lives next door, wife helps him with personal ADL's,  patient on home oxygen, script verified with [facility] in [place]. Patient should be on 2 liters 24/7 with script dated 8/30/2022. Patient self reports he uses 5-6 liters 24/7. [facility] reports patient has needed many more tanks than expected. If patients oxygen needs are greater than 2 liters, new home oxygen testing is needed and script updated. Patient declines any home services at this time, reports he does not have and does not need a walker or any other assistive device. Patient presented via EMS for SOB, received solumedrol and nebulizer treatments PTA. Patient reports he is on chronic steroids. Still active smoker with 50 year history. On IV Rocephin Zithromax.         The Plan for Transition of Care is related to the following treatment goals of COPD exacerbation ([address] [facility]) [J44.1]    IF

## 2023-11-02 NOTE — H&P
[facility]  Office: [address], DO, Leydi Bruce, DO, Manolo Vargas, DO, Bertha Davidson, DO, Darling Gannon MD, Rhoda Sterling MD, Jani Walters MD, Amee Dash MD,  Dora Aguilar MD, Eduardo Jerry MD, Pedro Luis Alaniz MD,  Rosario Munroe MD, Ирина Miller MD, Bella Flores, DO, Gustavus Najjar, MD,  Dimitry Rodriguez, DO, Jolene Erazo MD, Gerald Kanner, MD, Keyla Tanner MD, Antonietta Hess MD,  Sandra Langston MD, Thao Dodge MD, Ortiz Poole MD, Damian Schrader MD, Elisabet Ponce MD, Cookie Brittle, DO, Maddy Johnson, DO, Jessica Costa MD,  Pierre Ornelas MD, Chelsi Magana, CNP,  Arely Treviño CNP, Maria Esther Masters, CNP,  Jonny Holden, [facility], Wyatt Oneal, CNP, Maximiliano Lee CNP, Hansel Angelucci, CNP, Guera Fuller, CNP, Kirsten Khan, CNP, Veronica Whyte, CNP, Neil Barraza, [facility], Ramiro Ray, CNP, Lyn Alvarez, [facility]    HISTORY AND PHYSICAL EXAMINATION            Date:   11/2/2023  Patient name:  Lonnie Palafox  Date of admission:  11/2/2023  8:23 AM  MRN:   [id number]  Account:  [de-identified]  YOB: 1945  PCP:    GIN Harrington CNP  Room:   [address] [place] [address]  Code Status:    Full Code    Chief Complaint:     Chief Complaint   Patient presents with    Shortness of Breath     SOB pat 3 days, worse today, EMS administered Solumedrol, 2 breathing tx and duoneb PTA. History Obtained From:     patient, electronic medical record    History of Present Illness:     Patient presents to the ED with complaints of worsening shortness of breath. Patient states that this morning he feels lousy and that his breathing has progressively worsened over the past 1 year. He has been experiencing a productive cough with gray colored sputum. He also reports wheezing at home as well as a decreased appetite. Patient does report using oxygen at home at 7 L/NC.

## 2023-11-02 NOTE — ED NOTES
ED to inpatient nurses report     Chief Complaint   Patient presents with    Shortness of Breath     SOB pat 3 days, worse today, EMS administered Solumedrol, 2 breathing tx and duoneb PTA. Present to ED from home  LOC: alert and orientated to name, place, date  Vital signs   Vitals:    11/02/23 1018 11/02/23 1031 11/02/23 1041 11/02/23 1047   BP:       Pulse:  90 85 88   Resp:   20    Temp:       SpO2: 98% 96% 97% 96%   Weight:          Oxygen Baseline 5L NC    Current needs required none   SEPSIS:   [] Lactate X 2 ordered (Yes or No)  [] Antibiotics given (Yes or No)  [] IV Fluids ordered (Yes or No)             [] 2nd IV completed (Yes or No)  [] Hourly Vital Signs (Validated)  [] Outstanding Orders:     LDAs:   Peripheral IV Left Hand (Active)       Peripheral IV 11/02/23 Right Forearm (Active)     Mobility: 1 assist  Fall Risk: [place] 1 Fall Risk  Presents to emergency department  because of falls (Syncope, seizure, or loss of consciousness): No (11/02/23 0826)  Age > 79: Yes (11/02/23 0826)  Altered Mental Status, Intoxication with alcohol or substance confusion (Disorientation, impaired judgment, poor safety awaremess, or inability to follow instructions): No (11/02/23 0826)  Impaired Mobility: Ambulates or transfers with assistive devices or assistance;  Unable to ambulate or transer.: Yes (11/02/23 0826)  Nursing Judgement: Yes (11/02/23 0826)  Standard Fall Risk Interventions : Spur the patient to the environment, especially the location of the bathroom (11/02/23 0826)  Pending ED orders: None  Present condition: fair  Code Status: unknown  Consults: IP CONSULT TO INTERNAL MEDICINE  []  Hospitalist  Completed  [] yes [] no Who:   []  Medicine  Completed  [] yes [] No Who:   []  Cardiology  Completed  [] yes [] No Who:   []  GI   Completed  [] yes [] No Who:   []  Neurology  Completed  [] yes [] No Who:   []  Nephrology Completed  [] yes [] No Who:    []  Vascular  Completed  [] yes [] No Who:   []

## 2023-11-03 PROBLEM — R64 PULMONARY CACHEXIA DUE TO COPD (HCC): Status: ACTIVE | Noted: 2023-01-01

## 2023-11-03 PROBLEM — J44.9: Status: ACTIVE | Noted: 2023-11-03

## 2023-11-03 PROBLEM — J44.9 PULMONARY CACHEXIA DUE TO COPD (HCC): Status: ACTIVE | Noted: 2023-01-01

## 2023-11-03 PROBLEM — E43 SEVERE MALNUTRITION (HCC): Status: ACTIVE | Noted: 2023-11-03

## 2023-11-03 PROBLEM — J96.22 ACUTE ON CHRONIC RESPIRATORY FAILURE WITH HYPOXIA AND HYPERCAPNIA (HCC): Status: ACTIVE | Noted: 2023-01-01

## 2023-11-03 PROBLEM — J43.1 PANLOBULAR EMPHYSEMA (HCC): Status: ACTIVE | Noted: 2023-11-03

## 2023-11-03 NOTE — PROGRESS NOTES
Intubation Note:    0110 Dr. Christopher Edwards called to bedside per NP Lera Sandhoff for worsening respiratory status. 0116: Patient given 20 mg Etomidate and 120 mg Succinylcholine given. 0117: Intubated 7.5 ET tube at 23 cm, color change noted and bilateral lung sounds heard, SPO2 98%. 0122: OG placed. 0125: CXR done- ET tube advanced to 26cm. Patient continues to maintain oxygen saturation in the high 90's.

## 2023-11-03 NOTE — PROGRESS NOTES
Pt unable to tolerate bipap mask. Writer gave IV Ativan at 1946 with pt still unable to tolerate bipap. Pt respirations at 30 and pt unable to get words out when talking. Dr. Irene Linares notified and ordered transfer to ICU and precedex drip. House supervisor updated and awaiting bed placement. Care ongoing.

## 2023-11-03 NOTE — PROGRESS NOTES
Patient very restless, trying to get out of bed and becoming combative while on NC despite Precedex. RN reports patient more combative on Precedex- Precedex stopped and patient given 1 mg Ativan. Patient using accessory muscles to breathe, less agitated but tachypneic with resps 31-36. Spoke w Dr Madelaine Sosa and updated on patient breathing and agitation. Recommend intubation. Spoke w Dr Kirsten Mendoza ED attending and Dr Kirsten Mendoza intubated patient. Recommendations fro sedation and pain medication obtained from Dr Madelaine Sosa and orders placed.

## 2023-11-03 NOTE — PLAN OF CARE
Problem: Discharge Planning  Goal: Discharge to home or other facility with appropriate resources  Outcome: Progressing     Problem: Skin/Tissue Integrity  Goal: Absence of new skin breakdown  Description: 1. Monitor for areas of redness and/or skin breakdown  2. Assess vascular access sites hourly  3. Every 4-6 hours minimum:  Change oxygen saturation probe site  4. Every 4-6 hours:  If on nasal continuous positive airway pressure, respiratory therapy assess nares and determine need for appliance change or resting period. Outcome: Progressing     Problem: Safety - Adult  Goal: Free from fall injury  Outcome: Progressing     Problem: ABCDS Injury Assessment  Goal: Absence of physical injury  Outcome: Progressing       Problem: Safety - Medical Restraint  Goal: Remains free of injury from restraints (Restraint for Interference with Medical Device)  Description: INTERVENTIONS:  1. Determine that other, less restrictive measures have been tried or would not be effective before applying the restraint  2. Evaluate the patient's condition at the time of restraint application  3. Inform patient/family regarding the reason for restraint  4.  Q2H: Monitor safety, psychosocial status, comfort, nutrition and hydration  Outcome: Progressing  Flowsheets (Taken 11/3/2023 0116 by Audrey Granados RN)  Remains free of injury from restraints (restraint for interference with medical device):   Determine that other, less restrictive measures have been tried or would not be effective before applying the restraint   Evaluate the patient's condition at the time of restraint application   Inform patient/family regarding the reason for restraint   Every 2 hours: Monitor safety, psychosocial status, comfort, nutrition and hydration

## 2023-11-03 NOTE — PROGRESS NOTES
2230:Pt transferred from progressive unit. On 5L nasa canula, precedex gtt started. Pt placed on  bipap. 2330 Pulling mask off, attempting to stand, sitting on edge of bed in tripod position. Pt does not want to wear bipap. Precedex gtt increased, almost maxed out. 0050: pt RR 30, spo2 high 80's, fighting bipap, ativan given, pushing staff away. Precedex shut off d/t increased confusion. Messaged Dr. Maximiliano Canada. Spoke to Dr. Maximiliano Canada on the phone, spoke to [facility] NP. Zaina Stratton decision to intubate. 0100: Dr. Kira Lucas at bedside to intubate pt.

## 2023-11-03 NOTE — ACP (ADVANCE CARE PLANNING)
Advance Care Planning     Advance Care Planning (ACP) Physician/NP/PA Conversation    Date of Conversation: 11/2/2023  Conducted with:  Healthcare Decision Maker: Next of Kin by law (only applies in absence of above) (name) wife    Healthcare Decision Maker:      Primary Decision Maker: Christopher Elder - Spouse - [phone]    Click here to complete [address] including selection of the Healthcare Decision Maker Relationship (ie \"Primary\")  Today we documented Decision Maker(s) consistent with Legal Next of Kin hierarchy. Care Preferences:    Hospitalization: \"If your health worsens and it becomes clear that your chance of recovery is unlikely, what would be your preference regarding hospitalization? \"  The patient would prefer hospitalization. Ventilation: \"If you were unable to breath on your own and your chance of recovery was unlikely, what would be your preference about the use of a ventilator (breathing machine) if it was available to you? \"  The patient would desire the use of a ventilator. Resuscitation: \"In the event your heart stopped as a result of an underlying serious health condition, would you want attempts made to restart your heart, or would you prefer a natural death? \"  No, do NOT attempt to resuscitate.     treatment goals, benefit/burden of treatment options, ventilation preferences, hospitalization preferences, resuscitation preferences, and hospice care    Conversation Outcomes / Follow-Up Plan:  ACP complete - no further action today  Reviewed DNR/DNI and patient elects DNR order - completed portable DNR form & placed order    Length of Voluntary ACP Conversation in minutes:  <16 minutes (Non-Billable)    GIN Young - CNP

## 2023-11-03 NOTE — CONSULTS
lymphadenopathy. Lungs/pleura: Centrilobular paraseptal emphysema. Large bullae left apical  segment. Small anterior basilar pneumothorax decreased in size. Pigtail  chest tube has been withdrawn somewhat. Distal coil is still within the  pleural cavity at the left anterior lung base laterally. Sideholes may be  outside the pleural cavity. Extensive subcutaneous gas is noted left lateral  chest wall which appears increased. Right lower lobe consolidation increased. Upper Abdomen: Multiple hepatic cysts. 2.7 cm hypodense right adrenal nodule  consistent with adenoma. Soft Tissues/Bones: Normal    Impression  Small left basilar pneumothorax decreased. Pigtail chest tube has been  withdrawn somewhat but is still partially within the pleural cavity. Extensive left chest wall subcutaneous emphysema increased. Severe COPD. Right lower consolidation appears new/increased. RECOMMENDATIONS:  The findings were sent to the Radiology Results [employer] [facility] at 5:20  pm on 6/7/2023 to be communicated to a licensed caregiver. Xray Result (most recent):  XR CHEST PORTABLE 11/03/2023    Narrative  EXAMINATION:  ONE XRAY VIEW OF THE CHEST    11/3/2023 1:19 am    COMPARISON:  1 day prior    HISTORY:  ORDERING SYSTEM PROVIDED HISTORY: post intubation  TECHNOLOGIST PROVIDED HISTORY:  post intubation  Reason for Exam: post intubation    FINDINGS:  Endotracheal tube terminates 6.6 cm above the carla. Enteric tube courses  below the diaphragm with the tip and side-port not imaged. Stable findings  of emphysema with ill-defined right lung opacities. No pneumothorax or  pleural effusion. Cardiac and mediastinal contours stable. No acute osseous  abnormality. Impression  1. Endotracheal tube terminates 6.6 cm above the carla. 2. Stable right lung opacities. MRI Result (most recent):  No results found for this or any previous visit from the past 3650 days.       11/02/23    ECHO (TTE) edema  Neurological: +sedated on vent  Skin: Normal turgor, no bleeding, no bruising     Palliative Performance Scale:  ___60%  Ambulation reduced; Significant disease; Can't do hobbies/housework; intake normal or reduced; occasional assist; LOC full/confusion  ___50%  Mainly sit/lie; Extensive disease; Can't do any work; Considerable assist; intake normal or reduced; LOC full/confusion  ___40%  Mainly in bed; Extensive disease; Mainly assist; intake normal or reduced; LOC full/confusion   ___30%  Bed Bound; Extensive disease; Total care; intake reduced; LOCfull/confusion  ___20%  Bed Bound; Extensive disease; Total care; intake minimal; Drowsy/coma  _x__10%  Bed Bound; Extensive disease; Total care; Mouth care only; Drowsy/coma  ___0       Death        Thank you for allowing Palliative Care to participate in the care of Mr. Shanna Calloway . This note has been dictated by dragon, typing errors may be a possibility. The total time I spent in seeing the patient, discussing goals of care, advanced directives, code status and other major issues was more than 60 minutes      Electronically signed by   GIN Vazquez CNP  Palliative Care Team  on 11/3/2023 at 10:06 AM    Palliative Care can be reached via perfect serve.

## 2023-11-03 NOTE — PROGRESS NOTES
Report given and to brought to ICU. Pt belongings accounted for and telemetry returned. Pt stable during transfer. Care ongoing.

## 2023-11-03 NOTE — PROGRESS NOTES
Placed patient on Bipap, patient appeared much more comfortable, treatment given in line and then patient requsting mask off and trying to remove himself. Patient was placed back on 5L NC due to refusing to continue wearing bipap, RN aware.

## 2023-11-03 NOTE — PROGRESS NOTES
Received call from ICU nurse that patient has already taken off Bipap.  RN placed patient back on NC.

## 2023-11-03 NOTE — PROGRESS NOTES
Physical Therapy  DATE: 11/3/2023    NAME: Kylie Beavers  MRN: [id number]   : 1945    Patient not seen this date for Physical Therapy due to:      [] Cancel by RN or physician due to:    [] Hemodialysis    [] Critical Lab Value Level     [] Blood transfusion in progress    [] Acute or unstable cardiovascular status   _MAP < 55 or more than >115  _HR < 40 or > 130    [] Acute or unstable pulmonary status   -FiO2 > 60%   _RR < 5 or >40    _O2 sats < 85%    [] Strict Bedrest    [] Off Unit for surgery or procedure    [] Off Unit for testing       [] Pending imaging to R/O fracture    [] Refusal by Patient      [x] Other Patient intubated and sedated. [] PT being discontinued at this time. Patient independent. No further needs. [] PT being discontinued at this time as the patient has been transferred to hospice care. No further needs.       Concepción Lucas, PT

## 2023-11-03 NOTE — PROGRESS NOTES
Comprehensive Nutrition Assessment    Type and Reason for Visit:  Positive Nutrition Screen, Consult (Unplanned weight loss and decreased appetite. Consult: tube feeding ordering and management)    Nutrition Recommendations/Plan:   NPO diet  Recommend Glucerna 1.5 Facundo goal rate 30 mL/hr and one bottle of Healthy Shot per day (720 mL total volume, 1180 kcal and 83 gm of protein). TF, Healthy Shot and Propofol provides 1558 kcal and 83 gm of protein. Monitor tube feeding rate, tolerance, GI function, vent status, Propofol rate and labs     Malnutrition Assessment:  Malnutrition Status:  Insufficient data (11/03/23 1340)        Nutrition Assessment:    Patient admission is related to agitation, altered mental status and COPD exacerbation. Due to respiratory distress patient was but on BiPap but he did not want to wear it. Patient was transfered from PCU to ICU and intubated. Nutrition consult received for tube feeding order and management. RN reports patient has an OG tube in place. Patient is sedated with Propofol at 14.3 mL/hr (378 kcal) and receiving Solumedrol. Recommend tube feed Glucerna 1.5 Facundo goal rate 30 mL/hr (720 mL total volume and one bottle of Healthy Shot per day. Monitor tube feeding rate, tolerance, vent status, Propofol rate and labs. Nutrition Related Findings:    Non-pitting RLE edema. Active bowel sounds. Barrel chest- COPD. OG tube, intubated and sedated.  Solumedrol Wound Type: None       Current Nutrition Intake & Therapies:    Average Meal Intake: NPO  Average Supplements Intake: NPO  Diet NPO  ADULT TUBE FEEDING; Orogastric; Diabetic; Continuous; 15; Yes; 15; Q 4 hours; 30; 30; Q 4 hours; Protein; Health Shot 1 bottle per day  Current Tube Feeding (TF) Orders:  Feeding Route: Orogastric  Formula: Diabetic  Schedule: Continuous  Feeding Regimen: Glucerna 1.5 Facundo goal rate 30 mL/hr (720 mL total volume)  Additives/Modulars: Protein (Healthy Shot 1 bottle per day)  Water Flushes: 30 mL every 4 hours  Current TF & Flush Orders Provides: 1080 kcal and 59 gm of protein. TF and Healthy Shot provides 1180 kcal and 83 gm of protein  Goal TF & Flush Orders Provides: 1438 kcal and  gm of protein  Additional Calorie Sources:  Propofol at 14.3 mL/hr (378 kcal)    Anthropometric Measures:  Height: 177.8 cm (5' 10\")  Ideal Body Weight (IBW): 166 lbs (75 kg)       Current Body Weight: 64.3 kg (141 lb 12.1 oz), 85.4 % IBW. Weight Source: Bed Scale  Current BMI (kg/m2): 20.3  Usual Body Weight: 67.6 kg (149 lb) (6/14/23)  % Weight Change (Calculated): -4.9                    BMI Categories: Underweight (BMI less than 22) age over 72    Estimated Daily Nutrient Needs:  Energy Requirements Based On: Formula  Weight Used for Energy Requirements: Current  Energy (kcal/day): 1500 kcal [facility] 2003b)  Weight Used for Protein Requirements: Current  Protein (g/day):  gm of protein (1.3-1.6 gm/kg)  Method Used for Fluid Requirements: ml/Kg  Fluid (ml/day): 3908-0833 mL (25-30 mL/kg)    Nutrition Diagnosis:   Inadequate oral intake related to impaired respiratory function as evidenced by NPO or clear liquid status due to medical condition, intubation, nutrition support - enteral nutrition    Nutrition Interventions:   Food and/or Nutrient Delivery: Continue NPO, Start Tube Feeding  Nutrition Education/Counseling: Education not indicated  Coordination of Nutrition Care: Continue to monitor while inpatient       Goals:     Goals: Tolerate nutrition support at goal rate, Meet at least 75% of estimated needs       Nutrition Monitoring and Evaluation:      Food/Nutrient Intake Outcomes: Enteral Nutrition Intake/Tolerance, Diet Advancement/Tolerance  Physical Signs/Symptoms Outcomes: Biochemical Data, Fluid Status or Edema, Skin, Weight, GI Status    Discharge Planning:     Too soon to determine           Sheridan TAMEZN, RDN, LDN  Lead Clinical Dietitian  RD Office Phone [phone]

## 2023-11-03 NOTE — PLAN OF CARE
Problem: Respiratory - Adult  Goal: Clear lung sounds  11/2/2023 2018 by Yvan Rebolledo RCP  Outcome: Progressing     Problem: Respiratory - Adult  Goal: Adequate oxygenation  Description: Adequate oxygenation  11/2/2023 2018 by Yvan Rebolledo RCP  Outcome: Progressing

## 2023-11-03 NOTE — PROGRESS NOTES
[facility]  Office: [phone]  David Womack, DO, Buck Aquino, DO, Ela Davidson, DO, Bita Matthews MD, Cheryl Smith MD, Denice Obrien MD, Jessie Leigh MD,  Jackie Garcia MD, Terese Conde MD, Marycruz Hall MD,  Kavon Zazueta MD, Fortino Craven MD, Homa Castañeda DO, Colt Lin MD,  Maryjo Murray DO, Sharath Gates MD, Jaycee Richardson MD, Maria R Sandhu MD, Tico Mondragon MD,  Candelario Harris MD, Harleen Rocha MD, Bubba Schwab, MD, Blayne Palomares MD, Nae Gamboa MD, Garland Reveles DO, Jane Fraser DO, Gavin Vance MD,  Marci Chacon MD, Selwyn Madrigal, CNP,  Emmy Carroll, CNP, Baljinder Cottrell, CNP,  Sarah Noe, DIANA, [place], CNP, Avel Zaragoza, CNP, Ama Schaeffer, CNP, Katlin Patterson, CNP, Lynette Marie, CNP, Shakira Townsend, CNP, Yeimy Moya, PARKER, Constance Rosas, CNP, Devi Snyder, [address] [place] [address]      Daily Progress Note     Admit Date: 11/2/2023  Bed/Room No.  3791/5097-74  Admitting Physician : Denice Obrien MD  Code Status :Full Code  Hospital Day:  LOS: 1 day   Chief Complaint:     Chief Complaint   Patient presents with    Shortness of Breath     SOB pat 3 days, worse today, EMS administered Solumedrol, 2 breathing tx and duoneb PTA. Principal Problem:    COPD exacerbation ([address])  Active Problems:    Smoker    Acute on chronic respiratory failure with hypoxia (HCC)    Bilateral leg edema  Resolved Problems:    * No resolved hospital problems. *    Subjective : Interval History/Significant events :  11/03/23    Patient remains intubated on propofol currently. Wife is at the bedside. He remains afebrile. He remains in normal sinus rhythm. Blood pressures controlled. On vent 30% FiO2 with PEEP of 8. Vitals - Stable afebrile  Labs -BUN 23, creatinine 0.8, WBC 8.2  ABG-7.52/53.3/74.5/43. 5. Chest x-ray history of COPD. Nursing notes , Consults notes reviewed. MD  11/3/2023'

## 2023-11-03 NOTE — PROGRESS NOTES
Occupational Therapy  [facility]  Occupational Therapy Not Seen Note    Patient not available for Occupational Therapy due to:    [] Testing:    [] Hemodialysis    [] Cancelled by RN:    []Refusal by Patient:    [] Surgery:     [x] Intubation:     [] Pain Medication:    [x] Sedation:     [] Spine Precautions :    [] Medical Instability:    [] Other:       Carl Cesar, OTR/L

## 2023-11-04 NOTE — PLAN OF CARE
Problem: Skin/Tissue Integrity  Goal: Absence of new skin breakdown  Description: 1. Monitor for areas of redness and/or skin breakdown  2. Assess vascular access sites hourly  3. Every 4-6 hours minimum:  Change oxygen saturation probe site  4. Every 4-6 hours:  If on nasal continuous positive airway pressure, respiratory therapy assess nares and determine need for appliance change or resting period. 11/3/2023 2017 by Carmita Mas RN  Outcome: Progressing  11/3/2023 0910 by Mallorie Bhatia RN  Outcome: Progressing     Problem: Safety - Adult  Goal: Free from fall injury  11/3/2023 2017 by Carmita Mas RN  Outcome: Progressing  11/3/2023 0910 by Mallorie Bhatia RN  Outcome: Progressing     Problem: ABCDS Injury Assessment  Goal: Absence of physical injury  11/3/2023 2017 by Carmita Mas RN  Outcome: Progressing  11/3/2023 0910 by Mallorie Bhatia RN  Outcome: Progressing     Problem: Safety - Medical Restraint  Goal: Remains free of injury from restraints (Restraint for Interference with Medical Device)  Description: INTERVENTIONS:  1. Determine that other, less restrictive measures have been tried or would not be effective before applying the restraint  2. Evaluate the patient's condition at the time of restraint application  3. Inform patient/family regarding the reason for restraint  4.  Q2H: Monitor safety, psychosocial status, comfort, nutrition and hydration  11/3/2023 2017 by Carmita Mas RN  Outcome: Progressing  Flowsheets (Taken 11/3/2023 1000)  Remains free of injury from restraints (restraint for interference with medical device):   Determine that other, less restrictive measures have been tried or would not be effective before applying the restraint   Evaluate the patient's condition at the time of restraint application   Inform patient/family regarding the reason for restraint   Every 2 hours: Monitor safety, psychosocial status, comfort, nutrition and hydration  11/3/2023

## 2023-11-04 NOTE — PROGRESS NOTES
Tez with [facility] called and stated that they are going to sign off and for staff to call if pt is declared brain dead.

## 2023-11-04 NOTE — PLAN OF CARE
Problem: Discharge Planning  Goal: Discharge to home or other facility with appropriate resources  11/4/2023 1338 by Magnolia Larkin RN  Outcome: Not Progressing  Flowsheets (Taken 11/4/2023 0930)  Discharge to home or other facility with appropriate resources: Identify barriers to discharge with patient and caregiver  11/4/2023 0648 by Adriana Rubio RN  Outcome: Progressing     Problem: Skin/Tissue Integrity  Goal: Absence of new skin breakdown  Description: 1. Monitor for areas of redness and/or skin breakdown  2. Assess vascular access sites hourly  3. Every 4-6 hours minimum:  Change oxygen saturation probe site  4. Every 4-6 hours:  If on nasal continuous positive airway pressure, respiratory therapy assess nares and determine need for appliance change or resting period.   11/4/2023 1338 by Magnolia Larkin RN  Outcome: Progressing  11/4/2023 0648 by Adriana Rubio RN  Outcome: Progressing     Problem: Safety - Adult  Goal: Free from fall injury  11/4/2023 1338 by Magnolia Larkin RN  Outcome: Progressing  11/4/2023 0648 by Adriana Rubio RN  Outcome: Progressing     Problem: ABCDS Injury Assessment  Goal: Absence of physical injury  11/4/2023 1338 by Magnolia Larkin RN  Outcome: Progressing  11/4/2023 0648 by Adriana Rubio RN  Outcome: Progressing     Problem: Respiratory - Adult  Goal: Clear lung sounds  11/4/2023 0758 by Mono Aparicio RCP  Outcome: Progressing  11/4/2023 0347 by Dominic Jasso RCP  Outcome: Progressing  Goal: Adequate oxygenation  Description: Adequate oxygenation  11/4/2023 0758 by Mono Aparicio RCP  Outcome: Progressing  11/4/2023 0347 by Dominic Jasso RCP  Outcome: Progressing  Goal: Provide mechanical and oxygen support to facilitate gas exchange  11/4/2023 0758 by Mono Aparicio RCP  Outcome: Progressing  11/4/2023 0347 by Dominic Jasso RCP  Outcome: Progressing     Problem: Safety - Medical Restraint  Goal: Remains free of injury from restraints (Restraint for Interference with Medical Device)  Description: INTERVENTIONS:  1. Determine that other, less restrictive measures have been tried or would not be effective before applying the restraint  2. Evaluate the patient's condition at the time of restraint application  3. Inform patient/family regarding the reason for restraint  4.  Q2H: Monitor safety, psychosocial status, comfort, nutrition and hydration  11/4/2023 1338 by Liliam Max RN  Outcome: Progressing  Flowsheets (Taken 11/4/2023 0800)  Remains free of injury from restraints (restraint for interference with medical device):   Determine that other, less restrictive measures have been tried or would not be effective before applying the restraint   Evaluate the patient's condition at the time of restraint application   Inform patient/family regarding the reason for restraint   Every 2 hours: Monitor safety, psychosocial status, comfort, nutrition and hydration  11/4/2023 0648 by Giovanni Darby RN  Outcome: Progressing  Flowsheets  Taken 11/4/2023 0600  Remains free of injury from restraints (restraint for interference with medical device): Every 2 hours: Monitor safety, psychosocial status, comfort, nutrition and hydration  Taken 11/4/2023 0400  Remains free of injury from restraints (restraint for interference with medical device):   Every 2 hours: Monitor safety, psychosocial status, comfort, nutrition and hydration   Determine that other, less restrictive measures have been tried or would not be effective before applying the restraint  Taken 11/4/2023 0200  Remains free of injury from restraints (restraint for interference with medical device): Determine that other, less restrictive measures have been tried or would not be effective before applying the restraint  Taken 11/4/2023 0000  Remains free of injury from restraints (restraint for interference with medical device):   Determine that other, less restrictive measures have

## 2023-11-04 NOTE — PLAN OF CARE
Problem: Respiratory - Adult  Goal: Clear lung sounds  Outcome: Progressing     Problem: Respiratory - Adult  Goal: Adequate oxygenation  Description: Adequate oxygenation  Outcome: Progressing     Problem: Respiratory - Adult  Goal: Provide mechanical and oxygen support to facilitate gas exchange  Outcome: Progressing     Problem: Discharge Planning  Goal: Discharge to home or other facility with appropriate resources  11/3/2023 2017 by Guy Murillo RN  Outcome: Not Progressing  Flowsheets (Taken 11/3/2023 1045)  Discharge to home or other facility with appropriate resources:   Identify barriers to discharge with patient and caregiver   Arrange for needed discharge resources and transportation as appropriate

## 2023-11-04 NOTE — PLAN OF CARE
Problem: Discharge Planning  Goal: Discharge to home or other facility with appropriate resources  11/4/2023 0648 by Marycruz Lay RN  Outcome: Progressing  11/3/2023 2017 by Mariano Amezquita RN  Outcome: Not Progressing  Flowsheets (Taken 11/3/2023 1045)  Discharge to home or other facility with appropriate resources:   Identify barriers to discharge with patient and caregiver   Arrange for needed discharge resources and transportation as appropriate     Problem: Skin/Tissue Integrity  Goal: Absence of new skin breakdown  Description: 1. Monitor for areas of redness and/or skin breakdown  2. Assess vascular access sites hourly  3. Every 4-6 hours minimum:  Change oxygen saturation probe site  4. Every 4-6 hours:  If on nasal continuous positive airway pressure, respiratory therapy assess nares and determine need for appliance change or resting period. 11/4/2023 0648 by Marycruz Lay RN  Outcome: Progressing  11/3/2023 2017 by Mariano Amezquita RN  Outcome: Progressing     Problem: Safety - Adult  Goal: Free from fall injury  11/4/2023 0648 by Marycruz Lay RN  Outcome: Progressing  11/3/2023 2017 by Mariano Amezquita RN  Outcome: Progressing     Problem: ABCDS Injury Assessment  Goal: Absence of physical injury  11/4/2023 0648 by Marycruz Lay RN  Outcome: Progressing  11/3/2023 2017 by Mariano Amezquita RN  Outcome: Progressing     Problem: Respiratory - Adult  Goal: Clear lung sounds  11/4/2023 0347 by Nicol Toscano RCP  Outcome: Progressing  Goal: Adequate oxygenation  Description: Adequate oxygenation  11/4/2023 0347 by Nicol Toscano RCP  Outcome: Progressing  Goal: Provide mechanical and oxygen support to facilitate gas exchange  11/4/2023 0347 by Nicol Toscano RCP  Outcome: Progressing     Problem: Safety - Medical Restraint  Goal: Remains free of injury from restraints (Restraint for Interference with Medical Device)  Description: INTERVENTIONS:  1.  Determine that other, caregiver   Arrange for needed discharge resources and transportation as appropriate

## 2023-11-04 NOTE — PLAN OF CARE
Problem: Respiratory - Adult  Goal: Clear lung sounds  11/4/2023 0758 by Pawel Cao RCP  Outcome: Progressing  11/4/2023 0347 by Nereida Alonzo RCP  Outcome: Progressing  Goal: Adequate oxygenation  Description: Adequate oxygenation  11/4/2023 0758 by Pawel Cao RCP  Outcome: Progressing  11/4/2023 0347 by Nereida Alonzo RCP  Outcome: Progressing  Goal: Provide mechanical and oxygen support to facilitate gas exchange  11/4/2023 0758 by Pawel Cao RCP  Outcome: Progressing  11/4/2023 0347 by Nereida Alonzo RCP  Outcome: Progressing

## 2023-11-04 NOTE — PROGRESS NOTES
Physical Therapy        Physical Therapy Cancel Note      DATE: 2023    NAME: Yolanda Leigh  MRN: [id number]   : 1945      Patient not seen this date for Physical Therapy due to:    Pt. Remains on vent. Will continue to follow pt. And eval when medically appropriate.        Electronically signed by Maggie Brasher PT on 2023 at 8:39 AM

## 2023-11-05 NOTE — PLAN OF CARE
Problem: Skin/Tissue Integrity  Goal: Absence of new skin breakdown  Description: 1. Monitor for areas of redness and/or skin breakdown  2. Assess vascular access sites hourly  3. Every 4-6 hours minimum:  Change oxygen saturation probe site  4. Every 4-6 hours:  If on nasal continuous positive airway pressure, respiratory therapy assess nares and determine need for appliance change or resting period. Outcome: Progressing     Problem: Safety - Adult  Goal: Free from fall injury  Outcome: Progressing     Problem: ABCDS Injury Assessment  Goal: Absence of physical injury  Outcome: Progressing     Problem: Respiratory - Adult  Goal: Clear lung sounds  11/5/2023 0805 by Joan Gan RCP  Outcome: Progressing  Goal: Adequate oxygenation  Description: Adequate oxygenation  11/5/2023 0805 by Joan Gan RCP  Outcome: Progressing  Goal: Provide mechanical and oxygen support to facilitate gas exchange  11/5/2023 0805 by Joan Gan RCP  Outcome: Progressing     Problem: Safety - Medical Restraint  Goal: Remains free of injury from restraints (Restraint for Interference with Medical Device)  Description: INTERVENTIONS:  1. Determine that other, less restrictive measures have been tried or would not be effective before applying the restraint  2. Evaluate the patient's condition at the time of restraint application  3. Inform patient/family regarding the reason for restraint  4.  Q2H: Monitor safety, psychosocial status, comfort, nutrition and hydration  Outcome: Progressing  Flowsheets (Taken 11/5/2023 0800)  Remains free of injury from restraints (restraint for interference with medical device):   Determine that other, less restrictive measures have been tried or would not be effective before applying the restraint   Evaluate the patient's condition at the time of restraint application   Inform patient/family regarding the reason for restraint   Every 2 hours: Monitor safety, psychosocial status, comfort,

## 2023-11-05 NOTE — PLAN OF CARE
Problem: Respiratory - Adult  Goal: Clear lung sounds  Outcome: Progressing  Goal: Adequate oxygenation  Description: Adequate oxygenation  Outcome: Progressing  Goal: Provide mechanical and oxygen support to facilitate gas exchange  Outcome: Progressing

## 2023-11-05 NOTE — PROGRESS NOTES
Respiratory called this RN to bedside. Pt exhibiting \"belly breathing\"- much more exaggerated than at start of shift. ABG drawn. Crit care notified of ABG, respiratory depth, and current sedation. Orders given to start fentanyl gtt and get CXR. CXR pending already: XR called to have CXR read STAT. Awaiting impression.

## 2023-11-05 NOTE — PLAN OF CARE
Problem: Respiratory - Adult  Goal: Clear lung sounds  11/5/2023 1840 by Raymundo Gee RCP  Outcome: Progressing     Problem: Respiratory - Adult  Goal: Adequate oxygenation  Description: Adequate oxygenation  11/5/2023 1840 by Raymundo Gee RCP  Outcome: Progressing     Problem: Respiratory - Adult  Goal: Provide mechanical and oxygen support to facilitate gas exchange  11/5/2023 1840 by Raymundo Gee RCP  Outcome: Progressing     Problem: Discharge Planning  Goal: Discharge to home or other facility with appropriate resources  Recent Flowsheet Documentation  Taken 11/5/2023 1500 by Kathie Gaines RN  Discharge to home or other facility with appropriate resources: Identify discharge learning needs (meds, wound care, etc)  11/5/2023 1302 by Liliam Max RN  Outcome: Not Progressing  Flowsheets (Taken 11/5/2023 0845)  Discharge to home or other facility with appropriate resources: Identify barriers to discharge with patient and caregiver

## 2023-11-05 NOTE — PROGRESS NOTES
Diprivan drip empty and IV was noted to be alarming. Pt sitting up in bed, eyes wide open, RR 30s and labored. New diprivan drip started. Pt able to squeeze hands and wiggle toes but no tracking noted. Pt enc to rest and lights dimmed and wife enc to sit quietly and stop stimulating pt and talking to him and touching him until his breathing settles down and he is not so labored. Will titrate diprivan if pt does not relax see MAR.

## 2023-11-05 NOTE — PROGRESS NOTES
This [facility]  Office: [address], DO, Renetta Torre, DO, Blayne Adams, DO, Addison Xavier Blood, DO, Reza Villagomez MD, Shabana Ross MD, Yvonne Chase MD, Kendra Carreon MD,  Mario Zee MD, Keren Perez MD, Nimisha Griffin MD,  Bryson Daugherty DO, Chiara Cardoso MD, Stacia Mendez MD, Pipo Paul DO, Vivek Armendariz MD,  Nelia Goel DO, Damian Reynoso MD, Mary Jo Valencia MD, Dunia Barton MD, Liz Argueta MD,  Doyle Kirk MD, Van Del Rio MD, Elizabeth Sheriff MD, Sheeba Sherman MD, Fernanda Barakat MD, Rhiannon Montalvo DO, Anup Major DO, Guy Cervantes MD,  Rashmi Patel MD, Tamiko Hall, CNP,  Shadi Ordoñez, CNP, Judith Saliva, CNP,  Yoel Heredia, DNP, Jamila Breed, CNP, Uli Hora, CNP, Destiney Coscorinne, CNP, Sarahy Dc, CNP, Omi Giang, CNP, Jaun Prakash, CNP, Nicole Garcia, CNS, Mercer Fabry, CNP, Dimple Lechuga, [address] [place] [address]      Daily Progress Note     Admit Date: 11/2/2023  Bed/Room No.  1145/8017-24  Admitting Physician : Yvonne Chase MD  Code Status :[facility] Day:  LOS: 3 days   Chief Complaint:     Chief Complaint   Patient presents with    Shortness of Breath     SOB pat 3 days, worse today, EMS administered Solumedrol, 2 breathing tx and duoneb PTA. 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 *    Subjective : Interval History/Significant events :  11/05/23    Patient is attended by his wife at bedside. He remains sedated on propofol and fentanyl.   Patient was

## 2023-11-05 NOTE — PLAN OF CARE
Problem: Skin/Tissue Integrity  Goal: Absence of new skin breakdown  Description: 1. Monitor for areas of redness and/or skin breakdown  2. Assess vascular access sites hourly  3. Every 4-6 hours minimum:  Change oxygen saturation probe site  4. Every 4-6 hours:  If on nasal continuous positive airway pressure, respiratory therapy assess nares and determine need for appliance change or resting period. 11/4/2023 2102 by Zaynab Monteiro RN  Outcome: Progressing  11/4/2023 1338 by Carmita Mas RN  Outcome: Progressing     Problem: Safety - Adult  Goal: Free from fall injury  11/4/2023 2102 by Zaynab Monteiro RN  Outcome: Progressing  11/4/2023 1338 by Carmita Mas RN  Outcome: Progressing     Problem: ABCDS Injury Assessment  Goal: Absence of physical injury  11/4/2023 2102 by Zaynab Monteiro RN  Outcome: Progressing  11/4/2023 1338 by Carmita Mas RN  Outcome: Progressing     Problem: Respiratory - Adult  Goal: Clear lung sounds  11/4/2023 0758 by Mackenzie Powell RCP  Outcome: Progressing  Goal: Adequate oxygenation  Description: Adequate oxygenation  11/4/2023 0758 by Mackenzie Powell RCP  Outcome: Progressing  Goal: Provide mechanical and oxygen support to facilitate gas exchange  11/4/2023 0758 by Mackenzie Powell RCP  Outcome: Progressing     Problem: Safety - Medical Restraint  Goal: Remains free of injury from restraints (Restraint for Interference with Medical Device)  Description: INTERVENTIONS:  1. Determine that other, less restrictive measures have been tried or would not be effective before applying the restraint  2. Evaluate the patient's condition at the time of restraint application  3. Inform patient/family regarding the reason for restraint  4.  Q2H: Monitor safety, psychosocial status, comfort, nutrition and hydration  11/4/2023 2102 by Zaynab Monteiro RN  Outcome: Progressing  Flowsheets (Taken 11/4/2023 1600 by Claudette Coats, RN)  Remains free of injury from restraints (restraint for interference with medical device): Determine that other, less restrictive measures have been tried or would not be effective before applying the restraint  11/4/2023 1338 by Guy Murillo RN  Outcome: Progressing  Flowsheets (Taken 11/4/2023 0800)  Remains free of injury from restraints (restraint for interference with medical device):   Determine that other, less restrictive measures have been tried or would not be effective before applying the restraint   Evaluate the patient's condition at the time of restraint application   Inform patient/family regarding the reason for restraint   Every 2 hours: Monitor safety, psychosocial status, comfort, nutrition and hydration     Problem: Nutrition Deficit:  Goal: Optimize nutritional status  11/4/2023 2102 by Brian Chun RN  Outcome: Progressing  11/4/2023 1338 by Guy Murillo RN  Outcome: Progressing     Problem: Discharge Planning  Goal: Discharge to home or other facility with appropriate resources  11/4/2023 2102 by Brian Chun RN  Outcome: Not Progressing  11/4/2023 1338 by Guy Murillo RN  Outcome: Not Progressing  Flowsheets (Taken 11/4/2023 0930)  Discharge to home or other facility with appropriate resources: Identify barriers to discharge with patient and caregiver

## 2023-11-06 NOTE — CARE COORDINATION
Discharge planning    Patient still intubated. FIO2 is 30%. Palliative following. Will CPAP trial today. Per Dr. Lyssa Madden, patient is not a candidate for a trach. Lives with wife and daughter is next door. Code is [facility]. May need LTACH.

## 2023-11-06 NOTE — PROGRESS NOTES
Patients ring on L ring finger placed in container with patient label d/t swelling. Container placed in locked cabinet in [address] [place] [address].

## 2023-11-06 NOTE — PROGRESS NOTES
Physical Therapy  DATE: 2023    NAME: Jaswant Tillman  MRN: [id number]   : 1945    Patient not seen this date for Physical Therapy due to:      [] Cancel by RN or physician due to:    [x] Pt remains intubated & sedated on vent      [] Critical Lab Value Level     [] Blood transfusion in progress    [] Acute or unstable cardiovascular status   _MAP < 55 or more than >115  _HR < 40 or > 130    [] Acute or unstable pulmonary status   -FiO2 > 60%   _RR < 5 or >40    _O2 sats < 85%    [] Strict Bedrest    [] Off Unit for surgery or procedure    [] Off Unit for testing       [] Pending imaging to R/O fracture    [] Refusal by Patient      [] Other      [] PT being discontinued at this time. Patient independent. No further needs. [] PT being discontinued at this time as the patient has been transferred to hospice care. No further needs.       [address] [address], PT

## 2023-11-06 NOTE — PROGRESS NOTES
CPAP trial started.    11/06/23 1001   Patient Observation   Pulse 64   Respirations 25   SpO2 91 %   Vent Information   Vent Mode (S)  CPAP/PS   Ventilator Settings   FiO2  30 %   PEEP/CPAP (cmH2O) 8   Pressure Support (cm H2O) (S)  15 cm H2O   Vent Patient Data (Readings)   Vt (Measured) 398 mL   Peak Inspiratory Pressure (cmH2O) 23 cmH2O   Rate Measured 25.2 br/min   Minute Volume (L/min) 10.1 Liters   Mean Airway Pressure (cmH2O) 13.5 cmH20   Plateau Pressure (cm H2O) 25.9 cm H2O   Driving Pressure 51.5   Vent Alarm Settings   High Pressure (cmH2O) 55 cmH2O   Low Minute Volume (lpm) 5 L/min   RR High (bpm) 30 br/min

## 2023-11-06 NOTE — PROGRESS NOTES
Cpap trial <5min, failed, heavy abdominal breathing, 195/95, RR 40's, PRVC restarted and Fentanyl restarted at 25mcg/hr.

## 2023-11-06 NOTE — PROGRESS NOTES
Comprehensive Nutrition Assessment    Type and Reason for Visit:  Reassess    Nutrition Recommendations/Plan:   NPO   Glucerna 1.5 at 25 ml/hour   Add health shot QD   Monitor labs as they become available   Monitor skin integrity/weights     Malnutrition Assessment:  Malnutrition Status:  Insufficient data (11/03/23 1340)    Context:        Findings of the 6 clinical characteristics of malnutrition:  Energy Intake:     Weight Loss: Body Fat Loss:        Muscle Mass Loss:       Fluid Accumulation:        Strength:       Nutrition Assessment:    Patient admission is related to agitation, altered mental status and COPD exacerbation. Due to respiratory distress patient was but on BiPap but he did not want to wear it. Patient was transfered from PCU to ICU and intubated. Apparently he had a previous admission 4 months ago but unfortunately continued to smoke, ICU huddle reveals he is not a candidate for a trach, CPAP trial was attempted but he only tolerated this for 5 moniutes, back on propofol and fentanyl drips, noted to be a DNRCCA, Propofol at 20.4 ml/hour (539 kcals) Glucerna 1.5 running at 30 ml/hour providing 1080 kcals, 59 grams of protein and 546 ml of water from formula. Will decrease rate to 25 ml/hour at this time due to increase in propofol and this will provide 900 kcals, 50 grams of protein and 455 ml of water from formula. Add health shot 1 time a day to provide 100 kcals and 24 grams of protein. Nutrition Related Findings:    11/2 BM, Edema to RUE +1 non-pitting, BLE trace edema, DNRCCA and palliative is following, intubated and sedated.  Wound Type: None       Current Nutrition Intake & Therapies:    Average Meal Intake: NPO  Average Supplements Intake: NPO  Current Tube Feeding (TF) Orders:  Feeding Route: Nasogastric  Formula: Diabetic  Schedule: Continuous  Feeding Regimen: Glucerna 1.5 kcal goal rate 25 ml/hr (600 ml total volume)  Additives/Modulars: Protein  Water Flushes: 30 ml every 4 hours  Current TF & Flush Orders Provides: 900 kcals and 50 grams of protein TF and healthy shot provides 1000 kcals and 74 grams of protein  Goal TF & Flush Orders Provides: 1539 kcals, and 74 grams of protein    Anthropometric Measures:  Height: 177.8 cm (5' 10\")  Ideal Body Weight (IBW): 166 lbs (75 kg)       Current Body Weight: 64.3 kg (141 lb 12.1 oz), 85.4 % IBW. Weight Source: Bed Scale  Current BMI (kg/m2): 20.3  Usual Body Weight: 67.6 kg (149 lb) (6/14/23)  % Weight Change (Calculated): -4.9                    BMI Categories: Underweight (BMI less than 22) age over 72    Estimated Daily Nutrient Needs:  Energy Requirements Based On: Kcal/kg  Weight Used for Energy Requirements: Current  Energy (kcal/day): 1471 kcals ([facility] 2003b)  Weight Used for Protein Requirements: Current  Protein (g/day):  grams per day using 1.2-1.6 g/kg of actual body weight  Method Used for Fluid Requirements: Other (Comment) (determined by physician patient in ICU.)  Fluid (ml/day): 7618-8449 mL (25-30 mL/kg)    Nutrition Diagnosis:   Inadequate oral intake related to impaired respiratory function as evidenced by NPO or clear liquid status due to medical condition, intubation, nutrition support - enteral nutrition    Nutrition Interventions:   Food and/or Nutrient Delivery: Modify Tube Feeding, Continue NPO  Nutrition Education/Counseling: Education not indicated  Coordination of Nutrition Care: Continue to monitor while inpatient  Plan of Care discussed with: ICU huddle    Goals:  Previous Goal Met: Progressing toward Goal(s)  Goals: Tolerate nutrition support at goal rate       Nutrition Monitoring and Evaluation:      Food/Nutrient Intake Outcomes: Enteral Nutrition Intake/Tolerance, Diet Advancement/Tolerance  Physical Signs/Symptoms Outcomes: Biochemical Data, Fluid Status or Edema, Skin, Weight, GI Status    Discharge Planning:     Too soon to fern[address]  Contact: [phone]

## 2023-11-06 NOTE — PROGRESS NOTES
End Of Shift Note  [facility] ICU  Summary of shift: Fent gtt weaned off. RASS -2/-3 maintained throughout shift. No BM, 650mL urine output. TF continued with little residuals. NP notified of AM Na: no new orders. Vitals:    Vitals:    11/06/23 0630 11/06/23 0645 11/06/23 0700 11/06/23 0715   BP:       Pulse: 54 59 53 56   Resp: 20 20 19 19   Temp:       TempSrc:       SpO2: 91% 91% 90% 91%   Weight:       Height:            I&O:   Intake/Output Summary (Last 24 hours) at 11/6/2023 0725  Last data filed at 11/6/2023 4217  Gross per 24 hour   Intake 2111.92 ml   Output 1800 ml   Net 311.92 ml       Resp Status: Vent settings below    Ventilator Settings:  Vent Mode: AC/PRVC Resp Rate (Set): 12 bpm/Vt (Set, mL): 500 mL/ /FiO2 : 30 %    Critical Care IV infusions:   propofol 50 mcg/kg/min (11/06/23 0607)    fentaNYL 25 mcg/hr (11/05/23 1240)    sodium chloride 10 mL/hr at 11/06/23 0607        LDA:   Peripheral IV 11/03/23 Left Antecubital (Active)   Number of days: 3       Peripheral IV 11/05/23 Left; Anterior Cephalic (Active)   Number of days: 1       NG/OG/NJ/NE Tube Orogastric 16 fr Center mouth (Active)   Number of days: 3       External Urinary Catheter (Active)   Number of days: 2       ETT  (Active)   Number of days: 3       Arterial Line 11/05/23 Right Radial (Active)   Number of days: 0       Incision 06/13/23 Chest Lateral;Left;Upper (Active)   Number of days: 145

## 2023-11-06 NOTE — PLAN OF CARE
Problem: Discharge Planning  Goal: Discharge to home or other facility with appropriate resources  11/5/2023 2138 by sEthela Deleon RN  Outcome: Not Progressing  Flowsheets (Taken 11/5/2023 1500 by Farshad Ibarra RN)  Discharge to home or other facility with appropriate resources: Identify discharge learning needs (meds, wound care, etc)     Problem: Pain  Goal: Verbalizes/displays adequate comfort level or baseline comfort level  11/5/2023 2138 by Esthela Deleon RN  Outcome: Not Progressing

## 2023-11-06 NOTE — PLAN OF CARE
Problem: Skin/Tissue Integrity  Goal: Absence of new skin breakdown  Description: 1. Monitor for areas of redness and/or skin breakdown  2. Assess vascular access sites hourly  3. Every 4-6 hours minimum:  Change oxygen saturation probe site  4. Every 4-6 hours:  If on nasal continuous positive airway pressure, respiratory therapy assess nares and determine need for appliance change or resting period. 11/5/2023 2138 by Kimberly Lopez RN  Outcome: Progressing  11/5/2023 1302 by Brannon Glez RN  Outcome: Progressing     Problem: Safety - Adult  Goal: Free from fall injury  11/5/2023 2138 by Kimberly Lopez RN  Outcome: Progressing  11/5/2023 1302 by Brannon Glez RN  Outcome: Progressing     Problem: ABCDS Injury Assessment  Goal: Absence of physical injury  11/5/2023 2138 by Kimberly Lopez RN  Outcome: Progressing  11/5/2023 1302 by Brannon Glez RN  Outcome: Progressing     Problem: Respiratory - Adult  Goal: Clear lung sounds  11/5/2023 1840 by Elena Pan RCP  Outcome: Progressing  11/5/2023 0805 by Lew Salazar RCP  Outcome: Progressing  Goal: Adequate oxygenation  Description: Adequate oxygenation  11/5/2023 1840 by Elena Pan RCP  Outcome: Progressing  11/5/2023 0805 by Lew Salazar RCP  Outcome: Progressing  Goal: Provide mechanical and oxygen support to facilitate gas exchange  11/5/2023 1840 by Elena Pan RCP  Outcome: Progressing  11/5/2023 0805 by Lew Salazar RCP  Outcome: Progressing     Problem: Safety - Medical Restraint  Goal: Remains free of injury from restraints (Restraint for Interference with Medical Device)  Description: INTERVENTIONS:  1. Determine that other, less restrictive measures have been tried or would not be effective before applying the restraint  2. Evaluate the patient's condition at the time of restraint application  3. Inform patient/family regarding the reason for restraint  4.  Q2H: Monitor safety, psychosocial status, comfort, nutrition

## 2023-11-06 NOTE — PROGRESS NOTES
DATE: 2023    NAME: Farideh Eugene  MRN: [id number]   : 1945    Patient not seen this date for Occupational Therapy due to:      [] Cancel by RN or physician due to:    [] Hemodialysis    [] Critical Lab Value Level     [] Blood transfusion in progress    [] Acute or unstable cardiovascular status   _MAP < 55 or more than >115  _HR < 40 or > 130    [] Acute or unstable pulmonary status   -FiO2 > 60%   _RR < 5 or >40    _O2 sats < 85%    [] Strict Bedrest    [] Off Unit for surgery or procedure    [] Off Unit for testing       [] Pending imaging to R/O fracture    [] Refusal by Patient      [x] Other- cx eval; pt intubated/not appropriate for skilled OT at this time      [] PT being discontinued at this time. Patient independent. No further needs. [] PT being discontinued at this time as the patient has been transferred to hospice care. No further needs.       Tomas Ibarra OT

## 2023-11-06 NOTE — PROGRESS NOTES
Insert Arterial Line  Date/Time:  11/05/23, 11:48 PM  Performed by: Troy Redmond RCP    Patient identity confirmed: arm band and provided demographic data   Time out: Immediately prior to procedure a \"time out\" was called to verify the correct patient, procedure, equipment, support staff. Preparation: Patient was prepped and draped in the usual sterile fashion.     Location:right radial    Shyam's test normal: yes  Needle gauge: 20     Number of attempts: 1  Post-procedure: transparent dressing applied and line secured    Patient tolerance: well     Inserted per Dr. Charisse Javier

## 2023-11-06 NOTE — PROGRESS NOTES
This [facility]  Office: [address], DO, Buck Aquino, DO, Alexandra Roger, DO, Stuart Davidson, DO, Bita Matthews MD, Cheryl Smith MD, Denice Obrien MD, Jessie Leigh MD,  Jackie Garcia MD, Terese Conde MD, Marycruz Hall MD,  Audrey Steiner, DO, Steve Waldrop MD, Fortino Craven MD, Homa Castañeda DO, Colt Lin MD,  Maryjo Murray DO, Sharath Gates MD, Jaycee Richardson MD, Maria R Sandhu MD, Tico Mondragon MD,  Candelario Harris MD, Harleen Rocha MD, Bubba Schwab, MD, Blayne Palomares MD, Nae Gamboa MD, Garland Reveles DO, Jane Fraser, DO, Gavin Vance MD,  Marci Chacon MD, Selwyn Madrigal, CNP,  Emmy Carroll, CNP, Baljinder Cottrell, CNP,  Sarah Noe, DIANA, [place], CNP, Avel Zaragoza, CNP, Ama Schaeffer, CNP, Katlin Patterson, CNP, Lynette Marie, CNP, Shakira Townsend, CNP, Yeimy Moya, CNS, Constance Rosas, CNP, Devi Snyder, [address] [place] [address]      Daily Progress Note     Admit Date: 11/2/2023  Bed/Room No.  1901/3089-64  Admitting Physician : Denice Obrien MD  Code Status :[facility] Day:  LOS: 4 days   Chief Complaint:     Chief Complaint   Patient presents with    Shortness of Breath     SOB pat 3 days, worse today, EMS administered Solumedrol, 2 breathing tx and duoneb PTA. 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 *    Subjective : Interval History/Significant events :  11/06/23    Patient remains on ventilator. He is on 50 mcg/min of propofol. Patient currently off fentanyl.   He

## 2023-11-07 NOTE — PROGRESS NOTES
Occupational Therapy  DATE: 2023    NAME: Marcial Jimenez  MRN: [id number]   : 1945    Patient not seen this date for Occupational Therapy due to:      [] Cancel by RN or physician due to:    [] Hemodialysis    [] Critical Lab Value Level     [] Blood transfusion in progress    [] Acute or unstable cardiovascular status   _MAP < 55 or more than >115  _HR < 40 or > 130    [] Acute or unstable pulmonary status   -FiO2 > 60%   _RR < 5 or >40    _O2 sats < 85%    [] Strict Bedrest    [] Off Unit for surgery or procedure    [] Off Unit for testing       [] Pending imaging to R/O fracture    [] Refusal by Patient      [x] Other-; cx eval; pt intubated/not appropriate for skilled OT at this time      [] PT being discontinued at this time. Patient independent. No further needs. [] PT being discontinued at this time as the patient has been transferred to hospice care. No further needs.       Madi Arevalo, OT

## 2023-11-07 NOTE — PROGRESS NOTES
Physical Therapy    DATE: 2023    NAME: Shannen Saenz  MRN: [id number]   : 1945    Patient not seen this date for Physical Therapy due to:      [] Cancel by RN or physician due to:    [] Hemodialysis    [] Critical Lab Value Level     [] Blood transfusion in progress    [] Acute or unstable cardiovascular status   _MAP < 55 or more than >115  _HR < 40 or > 130    [x] Acute or unstable pulmonary status   -FiO2 > 60%(currently at 70%)   _RR < 5 or >40    _O2 sats < 85%    [] Strict Bedrest    [] Off Unit for surgery or procedure    [] Off Unit for testing       [] Pending imaging to R/O fracture    [] Refusal by Patient      [] Other      [] PT being discontinued at this time. Patient independent. No further needs. [] PT being discontinued at this time as the patient has been transferred to hospice care. No further needs.       [address], PT

## 2023-11-07 NOTE — PLAN OF CARE
Problem: Respiratory - Adult  Goal: Clear lung sounds  Outcome: Progressing     Problem: Respiratory - Adult  Goal: Adequate oxygenation  Description: Adequate oxygenation  Outcome: Progressing     Problem: Respiratory - Adult  Goal: Provide mechanical and oxygen support to facilitate gas exchange  Outcome: Progressing

## 2023-11-07 NOTE — PLAN OF CARE
Problem: Discharge Planning  Goal: Discharge to home or other facility with appropriate resources  Outcome: Progressing  Flowsheets (Taken 11/6/2023 0800)  Discharge to home or other facility with appropriate resources: Identify barriers to discharge with patient and caregiver     Problem: Skin/Tissue Integrity  Goal: Absence of new skin breakdown  Description: 1. Monitor for areas of redness and/or skin breakdown  2. Assess vascular access sites hourly  3. Every 4-6 hours minimum:  Change oxygen saturation probe site  4. Every 4-6 hours:  If on nasal continuous positive airway pressure, respiratory therapy assess nares and determine need for appliance change or resting period. Outcome: Progressing     Problem: Safety - Adult  Goal: Free from fall injury  Outcome: Progressing  Flowsheets (Taken 11/6/2023 1431)  Free From Fall Injury: Instruct family/caregiver on patient safety     Problem: ABCDS Injury Assessment  Goal: Absence of physical injury  Outcome: Progressing  Flowsheets (Taken 11/6/2023 1431)  Absence of Physical Injury: Implement safety measures based on patient assessment     Problem: Respiratory - Adult  Goal: Clear lung sounds  11/6/2023 0831 by Harjinder Cardoso RCP  Outcome: Progressing  Goal: Adequate oxygenation  Description: Adequate oxygenation  11/6/2023 0831 by Harjinder Cardoso RCP  Outcome: Progressing  Goal: Provide mechanical and oxygen support to facilitate gas exchange  11/6/2023 0831 by Harjinder Cardoso RCP  Outcome: Progressing     Problem: Safety - Medical Restraint  Goal: Remains free of injury from restraints (Restraint for Interference with Medical Device)  Description: INTERVENTIONS:  1. Determine that other, less restrictive measures have been tried or would not be effective before applying the restraint  2. Evaluate the patient's condition at the time of restraint application  3. Inform patient/family regarding the reason for restraint  4.  Q2H: Monitor safety, psychosocial

## 2023-11-07 NOTE — PROGRESS NOTES
End Of Shift Note  [address] [place] [address] ICU  Summary of shift: Uneventful shift; Cpap trial x5 min, unsuccessful, CC/Pulmonary said we will retry tomorrow; wife at bedside part of shift, tearful, asking when patient will be able to come home. Good output via external catheter; maintained Prop at 50 and Fentanyl turned on, titrated between 25 mcg/hr and 50mcg/hr. TF at goal with low residuals (5 and 10mL). Follows commands (opens eyes and squeezes hands)    Vitals:    Vitals:    11/06/23 1731 11/06/23 2002 11/06/23 2003 11/06/23 2004   BP:       Pulse: 69 65 70 78   Resp: 21 17 15 21   Temp:       TempSrc:       SpO2: (!) 89% (!) 89% (!) 88% 90%   Weight:       Height:            I&O:   Intake/Output Summary (Last 24 hours) at 11/6/2023 2016  Last data filed at 11/6/2023 2011  Gross per 24 hour   Intake 1063.8 ml   Output 1800 ml   Net -736.2 ml       Resp Status: diminished t/o; 88-92% SpO2 entire shift. Ventilator Settings:  Vent Mode: AC/PRVC Resp Rate (Set): 12 bpm/Vt (Set, mL): 500 mL/ /FiO2 : 45 %    Critical Care IV infusions:   propofol 50 mcg/kg/min (11/06/23 2011)    fentaNYL 25 mcg/hr (11/06/23 2011)    sodium chloride Stopped (11/06/23 1833)        LDA:   Peripheral IV 11/03/23 Left Antecubital (Active)   Number of days: 3       Peripheral IV 11/05/23 Left; Anterior Cephalic (Active)   Number of days: 1       NG/OG/NJ/NE Tube Orogastric 16 fr Center mouth (Active)   Number of days: 3       External Urinary Catheter (Active)   Number of days: 3       ETT  (Active)   Number of days: 3       Arterial Line 11/05/23 Right Radial (Active)   Number of days: 0       Incision 06/13/23 Chest Lateral;Left;Upper (Active)   Number of days: 146

## 2023-11-07 NOTE — PLAN OF CARE
Problem: Discharge Planning  Goal: Discharge to home or other facility with appropriate resources  11/7/2023 1650 by Anson Peterson RN  Outcome: Progressing     Problem: Skin/Tissue Integrity  Goal: Absence of new skin breakdown  Description: 1. Monitor for areas of redness and/or skin breakdown  2. Assess vascular access sites hourly  3. Every 4-6 hours minimum:  Change oxygen saturation probe site  4. Every 4-6 hours:  If on nasal continuous positive airway pressure, respiratory therapy assess nares and determine need for appliance change or resting period. 11/7/2023 1650 by Anson Peterson RN  Outcome: Progressing     Problem: Safety - Adult  Goal: Free from fall injury  11/7/2023 1650 by Anson Peterson RN  Outcome: Progressing     Problem: ABCDS Injury Assessment  Goal: Absence of physical injury  11/7/2023 1650 by Anson Peterson RN  Outcome: Progressing     Problem: Safety - Medical Restraint  Goal: Remains free of injury from restraints (Restraint for Interference with Medical Device)  Description: INTERVENTIONS:  1. Determine that other, less restrictive measures have been tried or would not be effective before applying the restraint  2. Evaluate the patient's condition at the time of restraint application  3. Inform patient/family regarding the reason for restraint  4.  Q2H: Monitor safety, psychosocial status, comfort, nutrition and hydration  11/7/2023 1650 by Anson Peterson RN  Outcome: Progressing  Flowsheets  Taken 11/7/2023 0700 by Anson Peterson RN  Remains free of injury from restraints (restraint for interference with medical device):   Determine that other, less restrictive measures have been tried or would not be effective before applying the restraint   Evaluate the patient's condition at the time of restraint application   Inform patient/family regarding the reason for restraint   Every 2 hours: Monitor safety, psychosocial status, comfort, nutrition and hydration    Problem:

## 2023-11-07 NOTE — PLAN OF CARE
Problem: Discharge Planning  Goal: Discharge to home or other facility with appropriate resources  11/7/2023 0544 by Nayely Vann RN  Outcome: Progressing  11/6/2023 2001 by Darian Ballard RN  Outcome: Progressing  Flowsheets (Taken 11/6/2023 0800)  Discharge to home or other facility with appropriate resources: Identify barriers to discharge with patient and caregiver     Problem: Skin/Tissue Integrity  Goal: Absence of new skin breakdown  Description: 1. Monitor for areas of redness and/or skin breakdown  2. Assess vascular access sites hourly  3. Every 4-6 hours minimum:  Change oxygen saturation probe site  4. Every 4-6 hours:  If on nasal continuous positive airway pressure, respiratory therapy assess nares and determine need for appliance change or resting period. 11/7/2023 0544 by Nayely Vann RN  Outcome: Progressing  11/6/2023 2001 by Darian Ballard RN  Outcome: Progressing     Problem: Safety - Adult  Goal: Free from fall injury  11/7/2023 0544 by Nayely Vann RN  Outcome: Progressing  11/6/2023 2001 by Darian Ballard RN  Outcome: Progressing  Flowsheets (Taken 11/6/2023 1431)  Free From Fall Injury: Instruct family/caregiver on patient safety     Problem: ABCDS Injury Assessment  Goal: Absence of physical injury  11/7/2023 0544 by Nayely Vann RN  Outcome: Progressing  11/6/2023 2001 by Darian Ballard RN  Outcome: Progressing  Flowsheets (Taken 11/6/2023 1431)  Absence of Physical Injury: Implement safety measures based on patient assessment     Problem: Safety - Medical Restraint  Goal: Remains free of injury from restraints (Restraint for Interference with Medical Device)  Description: INTERVENTIONS:  1. Determine that other, less restrictive measures have been tried or would not be effective before applying the restraint  2. Evaluate the patient's condition at the time of restraint application  3.  Inform patient/family regarding the reason for

## 2023-11-07 NOTE — PROGRESS NOTES
End Of Shift Note  [facility]  Summary of shift: SPB 180s at the beginning of shift, 10 hydralazine given, no effect, 20 Labetalol ordered from NP, was effective. Patient was not following commands/moving eyes, was decreasing sedation to keep patient in ordered RASS range. As the sedation was weaned down , the patient to become responsive to stimulii, Patient required more oxygen. At one point FI02 was at 100%. Messaged CC doctor, ordered to increase sedation, give 10 nimbex, and STAT CXR. When Sedation was increased, Patient appeared comfortable and is satting 97% -99%. Slowly titrating FI02 down, currently at 70%. Did not give nimbex per CC doctor. Ordered CTA chest w/ Contrast to rule on PE. Patient is not following commands or moving eyes. No BM, 500 ml measured from bunn + saturated underpad. Does not appear in pain. Maxxed on propofol, fentanyl at 50 mcg/hr. Vitals:    Vitals:    11/07/23 0436 11/07/23 0500 11/07/23 0530 11/07/23 0600   BP:       Pulse:  60 60 57   Resp:  13 15 12   Temp:       TempSrc:       SpO2:  97% 97% 97%   Weight: 68.8 kg (151 lb 10.8 oz)      Height:            I&O:   Intake/Output Summary (Last 24 hours) at 11/7/2023 0609  Last data filed at 11/7/2023 0443  Gross per 24 hour   Intake 645.29 ml   Output 1650 ml   Net -1004.71 ml       Resp Status: Intubated      Ventilator Settings:  Vent Mode: AC/PRVC Resp Rate (Set): 12 bpm/Vt (Set, mL): 500 mL/ /FiO2 : 80 %    Critical Care IV infusions:   propofol 50 mcg/kg/min (11/07/23 0435)    fentaNYL 40 mcg/hr (11/07/23 0312)    sodium chloride Stopped (11/06/23 1833)        LDA:   Peripheral IV 11/03/23 Left Antecubital (Active)   Number of days: 4       Peripheral IV 11/05/23 Left; Anterior Cephalic (Active)   Number of days: 2       NG/OG/NJ/NE Tube Orogastric 16 fr Center mouth (Active)   Number of days: 4       External Urinary Catheter (Active)   Number of days: 3       ETT  (Active)   Number of days: 4       Arterial Line 11/05/23

## 2023-11-08 NOTE — PLAN OF CARE
Problem: Respiratory - Adult  Goal: Clear lung sounds  11/7/2023 1915 by Chris López RCP  Outcome: Progressing     Problem: Respiratory - Adult  Goal: Adequate oxygenation  Description: Adequate oxygenation  11/7/2023 1915 by Chris López RCP  Outcome: Progressing     Problem: Respiratory - Adult  Goal: Provide mechanical and oxygen support to facilitate gas exchange  11/7/2023 1915 by Chris López RCP  Outcome: Progressing

## 2023-11-08 NOTE — PROGRESS NOTES
Dr. Nataliia Epps aware of patient's elevated sodium - 0.45% NS ordered (see MAR). Wife at bedside and spoke with Dr. Nataliia Epps - waiting on critical care doctor to round. Critical care doctor to to review CT results/prognosis with family today. Will continue to monitor patient closely.

## 2023-11-08 NOTE — PROGRESS NOTES
RN paged palliative care requesting that they round on patient/speak with family tomorrow regarding goals of care, as Dr. Emile Gold reviewed most recent CT scan results and explained patient's poor prognosis to family.

## 2023-11-08 NOTE — PLAN OF CARE
Problem: Discharge Planning  Goal: Discharge to home or other facility with appropriate resources  Outcome: Progressing     Problem: Skin/Tissue Integrity  Goal: Absence of new skin breakdown  Description: 1. Monitor for areas of redness and/or skin breakdown  2. Assess vascular access sites hourly  3. Every 4-6 hours minimum:  Change oxygen saturation probe site  4. Every 4-6 hours:  If on nasal continuous positive airway pressure, respiratory therapy assess nares and determine need for appliance change or resting period. Outcome: Progressing     Problem: Safety - Adult  Goal: Free from fall injury  Outcome: Progressing     Problem: ABCDS Injury Assessment  Goal: Absence of physical injury  Outcome: Progressing     Problem: Respiratory - Adult  Goal: Clear lung sounds  11/8/2023 0744 by Roque Steele RCP  Outcome: Progressing  Goal: Adequate oxygenation  Description: Adequate oxygenation  11/8/2023 0744 by Roque Steele RCP  Outcome: Progressing  Goal: Provide mechanical and oxygen support to facilitate gas exchange  11/8/2023 0744 by Roque Steele RCP  Outcome: Progressing     Problem: Safety - Medical Restraint  Goal: Remains free of injury from restraints (Restraint for Interference with Medical Device)  Description: INTERVENTIONS:  1. Determine that other, less restrictive measures have been tried or would not be effective before applying the restraint  2. Evaluate the patient's condition at the time of restraint application  3. Inform patient/family regarding the reason for restraint  4.  Q2H: Monitor safety, psychosocial status, comfort, nutrition and hydration  Outcome: Progressing  Flowsheets (Taken 11/8/2023 0600 by Deven Hall RN)  Remains free of injury from restraints (restraint for interference with medical device): Determine that other, less restrictive measures have been tried or would not be effective before applying the restraint  Note: Patient remains in restraints, no signs of injury noted. ROM exercised performed q2 hours. Will continue to monitor patient closely.      Problem: Pain  Goal: Verbalizes/displays adequate comfort level or baseline comfort level  Outcome: Progressing     Problem: Nutrition Deficit:  Goal: Optimize nutritional status  Outcome: Progressing

## 2023-11-08 NOTE — PLAN OF CARE
Problem: Respiratory - Adult  Goal: Clear lung sounds  11/8/2023 0744 by Fred Will RCP  Outcome: Progressing     Problem: Respiratory - Adult  Goal: Adequate oxygenation  Description: Adequate oxygenation  11/8/2023 0744 by Fred Will RCP  Outcome: Progressing     Problem: Respiratory - Adult  Goal: Provide mechanical and oxygen support to facilitate gas exchange  11/8/2023 0744 by Fred Will RCP  Outcome: Progressing

## 2023-11-08 NOTE — PROGRESS NOTES
[facility]  Office: [address], DO, Jean Pierre Cooper, DO, Bentley Nicole, DO, Jim Pryor Blood, DO, Honey Boast, MD, Gregory Laura MD, Faiza Larson MD, Mirela Francisco MD,  Karen Villanueva MD, Rafa Marino MD, Tj Kapoor MD,  Garima Liang MD, Trini Baker MD, Marcello Neves, DO, Sameer Oneal MD,  Glenn Payne, DO, Asif Worthington MD, Hugo Ponce MD, Rod Bingham MD, Geraldine Marino MD,  Gricel Adames MD, Toshia Hall MD, Darcie Gary MD, Paulo Cornell MD, Loren Ricardo MD, Amanuel Wang DO, Ash Viera, DO, Vijaya Johnson MD,  Chen Snider MD, Paul Quijano, CNP,  Yvonne Esparza, CNP, Marci Fletcher, CNP,  Nelson Torres, [facility], Danielito Lewis, CNP, Monisha Suarez, CNP, Jacquelyn Closs, CNP, Gia Ga, CNP, Corrina Castillo, CNP, Chloe Mcdowell, CNP, Dina Kaur, CNS, Renu Reilly, CNP, Evangelina Hill, [facility]    Progress Note    11/8/2023    11:17 AM    Name:   Kiya Simon  MRN:     [id number]     [address]:      <[facility]>   Room:   [address] [place] [address] Day:  6  Admit Date:  11/2/2023  8:23 AM    PCP:   GIN Marmolejo CNP  Code Status:  DNR-CCA    Subjective:     C/C:   Chief Complaint   Patient presents with    Shortness of Breath     SOB pat 3 days, worse today, EMS administered Solumedrol, 2 breathing tx and duoneb PTA. Interval History Status: not changed. Patient seen and wife is at bedside. He is still on sedation and ventilated. His sodium- 151 despite increasing his free water flushes yesterday. 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 He has lost 40 lbs in the last year and smokes. He had a spont tension pneumothorax 4 months ago with chest tube. He presented to the ED with increased work of breathing, placed on 5L.   CXR revealed opacity in the right lower lung with a trace pleural effusion. He was started on IV antibiotics, steriods but continued to decline. He was then placed on Bipap. He was intubated on 11/3/2023 morning. Code status: No CPR/ shocks. Review of Systems:     Unable to obtain due to intubation and sedation    Medications: Allergies:  No Known Allergies    Current Meds:   Scheduled Meds:    sodium chloride  80 mL IntraVENous Once    QUEtiapine  25 mg Orogastric BID    sertraline  150 mg Orogastric Daily    busPIRone  5 mg Orogastric BID    Roflumilast  500 mcg Orogastric Daily    methylPREDNISolone  60 mg IntraVENous Q8H    albuterol  2.5 mg Nebulization 4x Daily RT    guaiFENesin  400 mg Per NG tube Q6H    arformoterol tartrate  15 mcg Nebulization BID RT    budesonide  0.5 mg Nebulization BID RT    pantoprazole (PROTONIX) 40 mg in sodium chloride (PF) 0.9 % 10 mL injection  40 mg IntraVENous Daily    sodium chloride flush  5-40 mL IntraVENous 2 times per day    enoxaparin  40 mg SubCUTAneous Daily    cefTRIAXone (ROCEPHIN) IV  1,000 mg IntraVENous Q24H    nicotine  1 patch TransDERmal Daily     Continuous Infusions:    sodium chloride 75 mL/hr at 11/08/23 1038    propofol 50 mcg/kg/min (11/08/23 1041)    fentaNYL 25 mcg/hr (11/08/23 0551)    sodium chloride 10 mL/hr at 11/08/23 0551     PRN Meds: sodium chloride flush, LORazepam, fentanNYL, acetaminophen **OR** acetaminophen, sodium chloride flush, sodium chloride, ondansetron **OR** ondansetron, polyethylene glycol, albuterol, perflutren lipid microspheres, hydrALAZINE    Data:     Past Medical History:   has a past medical history of COPD (chronic obstructive pulmonary disease) ([address]), Hyperlipidemia, and Hypertension. Social History:   reports that he has been smoking cigarettes. He has been smoking an average of .5 packs per day. He has never used smokeless tobacco. He reports that he does not drink alcohol and does not use drugs.      Family History: No family history on

## 2023-11-08 NOTE — PLAN OF CARE
Problem: Discharge Planning  Goal: Discharge to home or other facility with appropriate resources  11/7/2023 2317 by Vijay Pennington RN  Outcome: Progressing  Flowsheets (Taken 11/7/2023 2000)  Discharge to home or other facility with appropriate resources:   Identify barriers to discharge with patient and caregiver   Arrange for needed discharge resources and transportation as appropriate   Identify discharge learning needs (meds, wound care, etc)   Arrange for interpreters to assist at discharge as needed   Refer to discharge planning if patient needs post-hospital services based on physician order or complex needs related to functional status, cognitive ability or social support system     Problem: Skin/Tissue Integrity  Goal: Absence of new skin breakdown  Description: 1. Monitor for areas of redness and/or skin breakdown  2. Assess vascular access sites hourly  3. Every 4-6 hours minimum:  Change oxygen saturation probe site  4. Every 4-6 hours:  If on nasal continuous positive airway pressure, respiratory therapy assess nares and determine need for appliance change or resting period. 11/7/2023 2317 by Vijay Pennignton RN  Outcome: Progressing     Problem: Safety - Adult  Goal: Free from fall injury  11/7/2023 2317 by Vijay Pennington RN  Outcome: Progressing     Problem: ABCDS Injury Assessment  Goal: Absence of physical injury  11/7/2023 2317 by Vijay Pennington RN  Outcome: Progressing     Problem: Respiratory - Adult  Goal: Adequate oxygenation  Description: Adequate oxygenation  11/7/2023 1915 by Gopi Olson RCP  Outcome: Progressing     Problem: Respiratory - Adult  Goal: Provide mechanical and oxygen support to facilitate gas exchange  11/7/2023 1915 by Gopi Olson RCP  Outcome: Progressing     Problem: Safety - Medical Restraint  Goal: Remains free of injury from restraints (Restraint for Interference with Medical Device)  Description: INTERVENTIONS:  1.  Determine that other, less

## 2023-11-08 NOTE — PROGRESS NOTES
Physical Therapy  DATE: 2023    NAME: Farideh Eugene  MRN: [id number]   : 1945    Patient not seen this date for Physical Therapy due to:      [] Cancel by RN or physician due to:    [] Hemodialysis    [] Critical Lab Value Level     [] Blood transfusion in progress    [] Acute or unstable cardiovascular status   _MAP < 55 or more than >115  _HR < 40 or > 130    [x] Acute or unstable pulmonary status. Pt still intubated and FiO2 > 60%(65% currently). [] Strict Bedrest    [] Off Unit for surgery or procedure    [] Off Unit for testing       [] Pending imaging to R/O fracture    [] Refusal by Patient      [] Other      [] PT being discontinued at this time. Patient independent. No further needs. [] PT being discontinued at this time as the patient has been transferred to hospice care. No further needs.       [address], PT

## 2023-11-08 NOTE — PROGRESS NOTES
Dr. Jeimma Chahal notified of most recent sodium level. No new orders at this time, will continue to monitor.

## 2023-11-09 NOTE — PROGRESS NOTES
Patient's tube feeding changed to Nepro by dietitian (per Dr. Mcnally Palm request) due to elevated Sodium levels.

## 2023-11-09 NOTE — PLAN OF CARE
Problem: Discharge Planning  Goal: Discharge to home or other facility with appropriate resources  11/9/2023 0119 by Virginia Cole RN  Outcome: Progressing     Problem: Skin/Tissue Integrity  Goal: Absence of new skin breakdown  Description: 1. Monitor for areas of redness and/or skin breakdown  2. Assess vascular access sites hourly  3. Every 4-6 hours minimum:  Change oxygen saturation probe site  4. Every 4-6 hours:  If on nasal continuous positive airway pressure, respiratory therapy assess nares and determine need for appliance change or resting period. 11/9/2023 0119 by Virginia Cole RN  Outcome: Progressing     Problem: Safety - Adult  Goal: Free from fall injury  11/9/2023 0119 by Virginia Cole RN  Outcome: Progressing     Problem: ABCDS Injury Assessment  Goal: Absence of physical injury  11/9/2023 0119 by Virginia Cole RN  Outcome: Progressing     Problem: Respiratory - Adult  Goal: Clear lung sounds  11/8/2023 1922 by Talib Gaines RCP  Outcome: Progressing     Problem: Safety - Medical Restraint  Goal: Remains free of injury from restraints (Restraint for Interference with Medical Device)  Description: INTERVENTIONS:  1. Determine that other, less restrictive measures have been tried or would not be effective before applying the restraint  2. Evaluate the patient's condition at the time of restraint application  3. Inform patient/family regarding the reason for restraint  4.  Q2H: Monitor safety, psychosocial status, comfort, nutrition and hydration  11/9/2023 0119 by Virginia Cole RN  Outcome: Progressing  Flowsheets  Taken 11/9/2023 0000  Remains free of injury from restraints (restraint for interference with medical device):   Determine that other, less restrictive measures have been tried or would not be effective before applying the restraint   Evaluate the patient's condition at the time of restraint application   Inform patient/family regarding the reason for restraint Every 2 hours: Monitor safety, psychosocial status, comfort, nutrition and hydration  Taken 11/8/2023 2200  Remains free of injury from restraints (restraint for interference with medical device):   Determine that other, less restrictive measures have been tried or would not be effective before applying the restraint   Evaluate the patient's condition at the time of restraint application   Inform patient/family regarding the reason for restraint   Every 2 hours: Monitor safety, psychosocial status, comfort, nutrition and hydration  Taken 11/8/2023 2047  Remains free of injury from restraints (restraint for interference with medical device): Determine that other, less restrictive measures have been tried or would not be effective before applying the restraint  Taken 11/8/2023 2000  Remains free of injury from restraints (restraint for interference with medical device):   Determine that other, less restrictive measures have been tried or would not be effective before applying the restraint   Evaluate the patient's condition at the time of restraint application   Inform patient/family regarding the reason for restraint   Every 2 hours: Monitor safety, psychosocial status, comfort, nutrition and hydration     Problem: Pain  Goal: Verbalizes/displays adequate comfort level or baseline comfort level  11/9/2023 0119 by Vijay Pennington RN  Outcome: Progressing  Flowsheets (Taken 11/9/2023 0000)  Verbalizes/displays adequate comfort level or baseline comfort level:   Encourage patient to monitor pain and request assistance   Assess pain using appropriate pain scale   Administer analgesics based on type and severity of pain and evaluate response     Problem: Nutrition Deficit:  Goal: Optimize nutritional status  11/9/2023 0119 by Vijay Pennington RN  Outcome: Progressing

## 2023-11-09 NOTE — PROGRESS NOTES
Dr. Gutierrez Pod aware of continued elevated sodium. IV fluids changed from 1/2 NS to D5W (see MAR). Will continue with q 6 sodium checks.

## 2023-11-09 NOTE — PROGRESS NOTES
[facility]  Office: [address], DO, Amada Mena, DO, Monique Rao, DO, Pranav Davidson, DO, Jaspal Zheng MD, Rosario Solano MD, Radha Wallace MD, Mica Llanos MD,  Darrel Haider MD, Rickey Mazariegos MD, Jam Sharif MD,  Ramin Tripp MD, Piyush Gaona MD, Savage Syed DO, Marie Garrett MD,  Ally Sparrow DO, David Cadet MD, Paresh Durant MD, Anibal Powers MD, Abdirahman Meraz MD,  Rima Naranjo MD, Elías Chester MD, Maksim Jain MD, Che Villafuerte MD, Manuel Anthony MD, Analisa Dent, DO, Regi Iniguez, DO, Mohan Camejo MD,  Wilbur Mayers MD, Jose Luis Jenkins, CNP,  Dasha Burgos, CNP, Salima Conn, CNP,  Chandrika Tracey, [facility], Abdiel Ibarra, CNP, Anjelica Laguna, CNP, Nathanael Singh, CNP, Genesis Santos, CNP, Jessica Pérez, CNP, Janine Powers, CNP, Cayla Babb, [facility], Marquis Baldwin, CNP, Gavino , [address]    Progress Note    11/9/2023    8:17 AM    Name:   Talya Briggs  MRN:     [id number]     [address]:      [de-identified]   Room:   [address] [place] [address] Day:  7  Admit Date:  11/2/2023  8:23 AM    PCP:   GIN Chandra CNP  Code Status:  DNR-CCA    Subjective:     C/C:   Chief Complaint   Patient presents with    Shortness of Breath     SOB pat 3 days, worse today, EMS administered Solumedrol, 2 breathing tx and duoneb PTA. Interval History Status: not changed. Patient is still on sedation and ventilated. His sodium- 153 despite increasing his free water flushes yesterday. 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 He has lost 40 lbs in the last year and smokes. He had a spont tension pneumothorax 4 months ago with chest tube. He presented to the ED with increased work of breathing, placed on 5L.   CXR revealed opacity in the right lower lung with a trace pleural 06/04/2023 01:04 PM     Lab Results   Component Value Date/Time    CULTURE NO GROWTH 5 DAYS 06/04/2023 01:04 PM       Radiology:  XR CHEST PORTABLE    Result Date: 11/7/2023  1. Chronic emphysema, with basilar interstitial infiltrates which could represent atelectasis or pneumonia, possibly sequela of aspiration. XR CHEST PORTABLE    Result Date: 11/7/2023  Small left effusion unchanged. COPD. XR CHEST PORTABLE    Result Date: 11/6/2023  No acute process. Stable emphysematous changes     XR CHEST PORTABLE    Result Date: 11/5/2023  Findings of COPD and emphysema. Overall, no significant change since prior exams. XR CHEST PORTABLE    Result Date: 11/4/2023  Stable examination with findings of severe COPD and emphysema. Stable exam.     XR CHEST PORTABLE    Result Date: 11/3/2023  Severe COPD. Otherwise no acute cardiopulmonary process     XR CHEST PORTABLE    Result Date: 11/3/2023  1. Endotracheal tube terminates 6.6 cm above the carla. 2. Stable right lung opacities. XR CHEST PORTABLE    Result Date: 11/2/2023  1. Mild persistent opacities in the right lower lung and lung base with interval improvement since the prior study dated 07/14/2023. 2. Trace right pleural effusion. \ 3. Mild volume loss the right lung with hyperexpansion of the left lung.        Physical Examination:        General appearance:  intubated and sedated on vent  Mental Status:  sedated on a vent  Lungs:  Reduced BS bilaterally,  no W/R/R  Heart:  regular rate and rhythm, no murmur  Abdomen:  soft, nontender, nondistended, normal bowel sounds, no masses  Extremities:  no edema, redness, tenderness in the calves  Skin:  + ecchymosis on bilat arms    Assessment:        Hospital Problems             Last Modified POA    * (Principal) COPD exacerbation ([address]) 11/2/2023 Yes    Smoker 11/2/2023 Yes    Acute on chronic respiratory failure with hypoxia and hypercapnia ([address]) 11/3/2023 Yes    Bilateral leg edema 11/2/2023 Yes    Stage

## 2023-11-09 NOTE — PROGRESS NOTES
Patient had 2 episodes of tachycardia -first episode heart rate 170s then decreased back to 80-90 bpm. Second episode 130s then heart rate decreased back to 90s. RN notified Dr. Marge Rojo while on unit, no  further orders placed at this time. Will continue to monitor.

## 2023-11-09 NOTE — PLAN OF CARE
Problem: Respiratory - Adult  Goal: Clear lung sounds  11/8/2023 1922 by Kar Merida RCP  Outcome: Progressing     Problem: Respiratory - Adult  Goal: Adequate oxygenation  Description: Adequate oxygenation  11/8/2023 1922 by Kar Merida RCP  Outcome: Progressing     Problem: Respiratory - Adult  Goal: Provide mechanical and oxygen support to facilitate gas exchange  11/8/2023 1922 by Kar Merida RCP  Outcome: Progressing

## 2023-11-09 NOTE — PROGRESS NOTES
Physical Therapy  DATE: 2023    NAME: Morenita Vaughan  MRN: [id number]   : 1945    Patient not seen this date for Physical Therapy due to:      [] Cancel by RN or physician due to:    [x] PT being discontinued at this time. Pt has been intubated/sedated since 11/3/23 POOR PROGNOSIS &  N/A for PT. Please reorder PT if & when appropriate    [] Critical Lab Value Level     [] Blood transfusion in progress    [] Acute or unstable cardiovascular status   _MAP < 55 or more than >115  _HR < 40 or > 130    [] Acute or unstable pulmonary status   -FiO2 > 60%   _RR < 5 or >40    _O2 sats < 85%    [] Strict Bedrest    [] Off Unit for surgery or procedure    [] Off Unit for testing       [] Pending imaging to R/O fracture    [] Refusal by Patient      [] Other            [] PT being discontinued at this time as the patient has been transferred to hospice care. No further needs.       [address], PT

## 2023-11-09 NOTE — PLAN OF CARE
measures have been tried or would not be effective before applying the restraint  Taken 11/9/2023 1200 by Nargis Sims, PAULINE  Remains free of injury from restraints (restraint for interference with medical device): Determine that other, less restrictive measures have been tried or would not be effective before applying the restraint  Taken 11/9/2023 1000 by Nargis iSms RN  Remains free of injury from restraints (restraint for interference with medical device): Determine that other, less restrictive measures have been tried or would not be effective before applying the restraint  Taken 11/9/2023 0800 by Nargis Sims RN  Remains free of injury from restraints (restraint for interference with medical device): Determine that other, less restrictive measures have been tried or would not be effective before applying the restraint  Taken 11/9/2023 0600 by Roberto Carlos Pacheco RN  Remains free of injury from restraints (restraint for interference with medical device):   Determine that other, less restrictive measures have been tried or would not be effective before applying the restraint   Evaluate the patient's condition at the time of restraint application   Inform patient/family regarding the reason for restraint   Every 2 hours: Monitor safety, psychosocial status, comfort, nutrition and hydration  Note: Patient remains in restraints for safety, no signs of injury. Will continue with ROM exercises q2 hrs. Will continue to monitor patient closely.      Problem: Pain  Goal: Verbalizes/displays adequate comfort level or baseline comfort level  Outcome: Progressing     Problem: Nutrition Deficit:  Goal: Optimize nutritional status  Outcome: Progressing  Flowsheets (Taken 11/9/2023 1041 by Lynda Rubio RD, LD)  Nutrient intake appropriate for improving, restoring, or maintaining nutritional needs:   Recommend, monitor, and adjust tube feedings and TPN/PPN based on assessed needs   Assess nutritional status and

## 2023-11-09 NOTE — PROGRESS NOTES
Comprehensive Nutrition Assessment    Type and Reason for Visit:  Reassess    Nutrition Recommendations/Plan:   NPO diet  Modify tube feeding to Nepro goal rate 26 mL/hr (624 mL total volume). Healthy Shot, one bottle per day. Water flush 200 mL every 4 hours (1200 mL total volume). TF, propofol and Healthy Shot provided 1762 kcal and 75 gm of protein  Monitor tube feeding rate, tolerance, GI function and labs     Malnutrition Assessment:  Malnutrition Status:  Insufficient data (11/03/23 1340)        Nutrition Assessment:    Patient continues to be intubated and sedated on the vent. Patient has elevated sodium and chloride labs. Doctor Tiffanie Ibrahim requested a low sodium tube feed formula. Patient is currently on Glucerna 1.5 Facundo. Tube feeding modified to Nepro goal rate 26 mL/hr (624 mL total volume) with one bottle of Healthy Shot protein supplement. Water flushes increased to 200 mL every 4 hours by physician. Palliative care likely to speak with patient's family about care goals Will monitor tube feeding tolerance, and labs. Nutrition Related Findings:    Edema: trace pitting BUE, +1 pitting BLE. Hypoactive bowel sounds. Intubated and sedated. Palliative following- DNR-CCA Wound Type: None       Current Nutrition Intake & Therapies:    Average Meal Intake: NPO  Average Supplements Intake: NPO  Diet NPO  ADULT TUBE FEEDING; Orogastric; Renal Formula; Continuous; 15; Yes; 15; Q 4 hours; 26; 200; Q 4 hours; Protein; Health Shot 1 bottle per day  Current Tube Feeding (TF) Orders:  Feeding Route: Nasogastric  Formula: Renal Formula  Schedule: Continuous  Feeding Regimen: Nepro at goal rate 26 mL/hr (624 mL total volume)  Additives/Modulars: Protein (One healthy shot per day)  Water Flushes: 200 mL every 4 hours (1200 mL total volume)  Current TF & Flush Orders Provides: 1123 kcal and 51 gm of protein.  TF and Healthy shot provides 1223 mirta and 75 gm of protein  Goal TF & Flush Orders Provides: 1675 kcal and  gm of

## 2023-11-10 NOTE — PROGRESS NOTES
[facility]  Office: [address], DO, Alberto Dys, DO, Kristian Finely, DO, Marielle Pastmae Blood, DO, Gala Thurman MD, Susan Mujica MD, Chriss Valenzuela MD, Bryan Stout MD,  Miguel Enrique MD, Mesha Milan MD, Ronak Posada MD,  Susan Crump MD, Ynug Soni MD, Jennifer Willard, DO, Angel Craig MD,  Chris Bryant, DO, Viridiana Cotto MD, Fili Tafoya MD, Clare James MD, Chao Gandara MD,  Ivon Lopez MD, Dayana Andrews MD, Kike Yu MD, Theresa Bautista MD, Di Martin MD, Joshua Gore, DO, Caden Love, DO, Aziza Cruz MD,  Wilmna Bailey MD, Matheus Iyer, CNP,  Dimple Mcallister, CNP, Eugenio Laurent, CNP,  Marry Obrien, DIANA, Brigitte Andino, CNP, Soledad Nayak, CNP, Jose Veronica CNP, Krystin Isaac, CNP, Tran Bangura, CNP, Andie Huber, CNP, Deon Dodd, CNS, Alexi Lombardo, CNP, Caryn Rudolph, [address] [place] [address]    Progress Note    11/10/2023    9:43 AM    Name:   Charisse Anderson  MRN:     [id number]     [address]:      [de-identified]   Room:   [address] [place] [address] Day:  8  Admit Date:  11/2/2023  8:23 AM    PCP:   GIN Muñoz CNP  Code Status:  DNR-CCA    Subjective:     C/C:   Chief Complaint   Patient presents with    Shortness of Breath     SOB pat 3 days, worse today, EMS administered Solumedrol, 2 breathing tx and duoneb PTA. Interval History Status: not changed. Patient is on Cpap for the last hour. His sedation has been decreased and his eyes are open, but he's not tracking. His wife is present. RR and BP are elevating. 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 He has lost 40 lbs in the last year and smokes. He had a spont tension pneumothorax 4 months ago with chest tube. He presented to the ED with increased work of breathing, placed on 5L.   CXR revealed opacity 01:04 PM     Lab Results   Component Value Date/Time    CULTURE NO GROWTH 5 DAYS 06/04/2023 01:04 PM       Radiology:  XR CHEST PORTABLE    Result Date: 11/7/2023  1. Chronic emphysema, with basilar interstitial infiltrates which could represent atelectasis or pneumonia, possibly sequela of aspiration. XR CHEST PORTABLE    Result Date: 11/7/2023  Small left effusion unchanged. COPD. XR CHEST PORTABLE    Result Date: 11/6/2023  No acute process. Stable emphysematous changes     XR CHEST PORTABLE    Result Date: 11/5/2023  Findings of COPD and emphysema. Overall, no significant change since prior exams. XR CHEST PORTABLE    Result Date: 11/4/2023  Stable examination with findings of severe COPD and emphysema. Stable exam.     XR CHEST PORTABLE    Result Date: 11/3/2023  Severe COPD. Otherwise no acute cardiopulmonary process     XR CHEST PORTABLE    Result Date: 11/3/2023  1. Endotracheal tube terminates 6.6 cm above the carla. 2. Stable right lung opacities. XR CHEST PORTABLE    Result Date: 11/2/2023  1. Mild persistent opacities in the right lower lung and lung base with interval improvement since the prior study dated 07/14/2023. 2. Trace right pleural effusion. \ 3. Mild volume loss the right lung with hyperexpansion of the left lung.        Physical Examination:        General appearance:  intubated and  awake on vent  Mental Status:  awake on a vent, not following commands  Lungs:  Reduced BS bilaterally,  + rhonchi bilateral  Heart:  regular rate and rhythm, no murmur  Abdomen:  soft, nontender, nondistended, normal bowel sounds, no masses  Extremities:  no edema, redness, tenderness in the calves  Skin:  + ecchymosis on bilat arms    Assessment:        Hospital Problems             Last Modified POA    * (Principal) COPD exacerbation ([address]) 11/2/2023 Yes    Smoker 11/2/2023 Yes    Acute on chronic respiratory failure with hypoxia and hypercapnia (HCC) 11/3/2023 Yes    Bilateral leg edema

## 2023-11-10 NOTE — CARE COORDINATION
Discharge planning    Chart reviewed. Per CM notes: Patient lives with spouse in two story home, daughter lives next door, wife helps him with personal ADL's,  patient on home oxygen, script verified with [facility] in [place]. Patient should be on 2 liters 24/7 with script dated 8/30/2022. Patient self reports he uses 5-6 liters 24/7. [facility] reports patient has needed many more tanks than expected. If patients oxygen needs are greater than 2 liters, new home oxygen testing is needed and script updated. Patient admitted with COPD exacerbation and is currently intubated. Propofol and fentanyl gtt. Pulmonary notes conversation with family regarding poor prognosis and may need trach. Jessy RN from palliative care did see patient and not following commands. She is to follow up with family to have discussion of goals of care. Uday Isaac from [facility] called and stated she is still following patient.

## 2023-11-10 NOTE — PLAN OF CARE
Problem: Respiratory - Adult  Goal: Clear lung sounds  11/10/2023 0726 by Jesusita Lemons RCP  Outcome: Progressing  11/9/2023 1915 by Fletcher Adames RCP  Outcome: Progressing

## 2023-11-10 NOTE — PLAN OF CARE
Problem: ABCDS Injury Assessment  Goal: Absence of physical injury  11/9/2023 2057 by Steph Deutsch RN  Outcome: Progressing     Problem: Safety - Adult  Goal: Free from fall injury  11/9/2023 2057 by Steph Deutsch RN  Outcome: Progressing     Problem: Skin/Tissue Integrity  Goal: Absence of new skin breakdown  Description: 1. Monitor for areas of redness and/or skin breakdown  2. Assess vascular access sites hourly  3. Every 4-6 hours minimum:  Change oxygen saturation probe site  4. Every 4-6 hours:  If on nasal continuous positive airway pressure, respiratory therapy assess nares and determine need for appliance change or resting period. 11/9/2023 2057 by Steph Deutsch RN  Outcome: Progressing     Problem: Discharge Planning  Goal: Discharge to home or other facility with appropriate resources  11/9/2023 2057 by Steph Deutsch RN  Outcome: Progressing     Problem: ABCDS Injury Assessment  Goal: Absence of physical injury  11/9/2023 2057 by Steph Deutsch RN  Outcome: Progressing     Problem: Safety - Medical Restraint  Goal: Remains free of injury from restraints (Restraint for Interference with Medical Device)  Description: INTERVENTIONS:  1. Determine that other, less restrictive measures have been tried or would not be effective before applying the restraint  2. Evaluate the patient's condition at the time of restraint application  3. Inform patient/family regarding the reason for restraint  4.  Q2H: Monitor safety, psychosocial status, comfort, nutrition and hydration  11/9/2023 2057 by Steph Deutsch RN  Outcome: Progressing  Flowsheets  Taken 11/9/2023 2032  Remains free of injury from restraints (restraint for interference with medical device):   Determine that other, less restrictive measures have been tried or would not be effective before applying the restraint   Evaluate the patient's condition at the time of restraint application   Inform patient/family regarding the reason for

## 2023-11-11 NOTE — PROGRESS NOTES
End Of Shift Note  [address] [place] [address] ICU  Summary of shift: Patient started out on ventilator today. Patient did not tolerate sedation vacation and wean trial, was only on for 10 minutes. Patient did have higher residuals in AM, reglin was started, miralax given, TF held until next residual check. Wife and other family spoke to critical care regarding pt condition and unfortunate poor prognosis. Ultimately wife decided to terminally wean pt and keep patient comfortable/comfort care. Pt terminally extubated at [phone]. Was placed on bipap, pt looked uncomfortable and family agreed to take bipap off at 1315. Patient appeared to have uncomfortable breathing, meds given per order. Patient pronounced time of death to be at [phone] by provider.      Vitals:    Vitals:    11/11/23 1241 11/11/23 1256 11/11/23 1311 11/11/23 1326   BP:       Pulse: 88 89 94 84   Resp: (!) 37 23 (!) 35 (!) 38   Temp:       TempSrc:       SpO2: 95% 95% 97% (!) 87%   Weight:       Height:            I&O:   Intake/Output Summary (Last 24 hours) at 11/11/2023 1521  Last data filed at 11/11/2023 1332  Gross per 24 hour   Intake 4700.89 ml   Output 1210 ml   Net 3490.89 ml       Resp Status: Vent to bipap to NC to absent breathings at 1448    Ventilator Settings:  Vent Mode: (S) NIV/PC Resp Rate (Set): 15 bpm/Vt (Set, mL): (S) 500 mL/ /FiO2 : 40 %    Critical Care IV infusions:   sodium chloride 50 mL/hr at 11/11/23 1234    fentaNYL Stopped (11/11/23 1233)    sodium chloride Stopped (11/08/23 1036)        LDA:   Peripheral IV 11/03/23 Left Antecubital (Active)   Number of days: 8       Peripheral IV 11/09/23 Left Forearm (Active)   Number of days: 2       NG/OG/NJ/NE Tube Orogastric 16 fr Center mouth (Active)   Number of days: 8       External Urinary Catheter (Active)   Number of days: 8       Arterial Line 11/05/23 Right Radial (Active)   Number of days: 5       Incision 06/13/23 Chest Lateral;Left;Upper (Active)   Number of days: 151

## 2023-11-11 NOTE — PLAN OF CARE
Problem: Discharge Planning  Goal: Discharge to home or other facility with appropriate resources  Outcome: Not Progressing       Problem: Skin/Tissue Integrity  Goal: Absence of new skin breakdown  Description: 1. Monitor for areas of redness and/or skin breakdown  2. Assess vascular access sites hourly  3. Every 4-6 hours minimum:  Change oxygen saturation probe site  4. Every 4-6 hours:  If on nasal continuous positive airway pressure, respiratory therapy assess nares and determine need for appliance change or resting period. 11/11/2023 0957 by Jess Diehl RN  Outcome: Progressing  11/10/2023 2158 by Jolly Robertson RN  Outcome: Progressing     Problem: Safety - Adult  Goal: Free from fall injury  11/11/2023 0957 by Jess Diehl RN  Outcome: Progressing  11/10/2023 2158 by Jolly Robertson RN  Outcome: Progressing     Problem: ABCDS Injury Assessment  Goal: Absence of physical injury  11/11/2023 0957 by Jess Diehl RN  Outcome: Progressing  11/10/2023 2158 by Jolly Robertson RN  Outcome: Progressing     Problem: Respiratory - Adult  Goal: Clear lung sounds  11/11/2023 0935 by Sheryle Glatter A, RCP  Outcome: Progressing  Goal: Adequate oxygenation  Description: Adequate oxygenation  11/11/2023 0935 by Sheryle Glatter A, RCP  Outcome: Progressing  Goal: Provide mechanical and oxygen support to facilitate gas exchange  11/11/2023 0935 by Sheryle Glatter A, RCP  Outcome: Progressing     Problem: Safety - Medical Restraint  Goal: Remains free of injury from restraints (Restraint for Interference with Medical Device)  Description: INTERVENTIONS:  1. Determine that other, less restrictive measures have been tried or would not be effective before applying the restraint  2. Evaluate the patient's condition at the time of restraint application  3. Inform patient/family regarding the reason for restraint  4.  Q2H: Monitor safety, psychosocial status, comfort, nutrition and hydration  11/11/2023 0957 by Charbel Meyers RN  Outcome: Progressing  11/10/2023 2158 by Kimberly Lopez RN  Outcome: Progressing  Flowsheets  Taken 11/10/2023 1923 by Kimberly Lopez RN  Remains free of injury from restraints (restraint for interference with medical device):   Determine that other, less restrictive measures have been tried or would not be effective before applying the restraint   Evaluate the patient's condition at the time of restraint application   Inform patient/family regarding the reason for restraint   Every 2 hours: Monitor safety, psychosocial status, comfort, nutrition and hydration  Taken 11/10/2023 1200 by Tuyet Almeida RN  Remains free of injury from restraints (restraint for interference with medical device):   Determine that other, less restrictive measures have been tried or would not be effective before applying the restraint   Evaluate the patient's condition at the time of restraint application   Every 2 hours: Monitor safety, psychosocial status, comfort, nutrition and hydration  Taken 11/10/2023 1000 by Tuyet Almeida RN  Remains free of injury from restraints (restraint for interference with medical device):   Every 2 hours: Monitor safety, psychosocial status, comfort, nutrition and hydration   Inform patient/family regarding the reason for restraint   Evaluate the patient's condition at the time of restraint application   Determine that other, less restrictive measures have been tried or would not be effective before applying the restraint  Taken 11/10/2023 0800 by Tuyet Almeida RN  Remains free of injury from restraints (restraint for interference with medical device):   Determine that other, less restrictive measures have been tried or would not be effective before applying the restraint   Evaluate the patient's condition at the time of restraint application   Inform patient/family regarding the reason for restraint   Every 2 hours: Monitor safety, psychosocial status, comfort,

## 2023-11-11 NOTE — PROGRESS NOTES
Patient extubated per physician order. Patient extubated in usual fashion. Patient placed on  NIV at charted settings.      Mira Elizalde RCP   12:46 PM

## 2023-11-11 NOTE — PLAN OF CARE
Problem: Respiratory - Adult  Goal: Clear lung sounds  11/10/2023 1913 by Barbara Evans RCP  Outcome: Progressing     Problem: Respiratory - Adult  Goal: Adequate oxygenation  Description: Adequate oxygenation  11/10/2023 1913 by Barbara Evans RCP  Outcome: Progressing     Problem: Respiratory - Adult  Goal: Provide mechanical and oxygen support to facilitate gas exchange  11/10/2023 1913 by Barbara Evans RCP  Outcome: Progressing

## 2023-11-11 NOTE — PLAN OF CARE
Problem: Discharge Planning  Goal: Discharge to home or other facility with appropriate resources  11/11/2023 0957 by Gela Mijares RN  Outcome: Not Progressing  Flowsheets (Taken 11/11/2023 0800 by Diamond Hankins RN)  Discharge to home or other facility with appropriate resources: Identify barriers to discharge with patient and caregiver

## 2023-11-11 NOTE — PROGRESS NOTES
Nurse had called [facility] prior to death to let them know that we terminally extubated patient and that patient will likely not survive. Nurse called [facility] to let them know of patient time of death 12, they stated patient was a possible tissue candidate, and that they will call us back.  released body, they are not a 's case. Shailesh Lange spoke to family at bedside and stayed on unit to assist with paperwork. We did not hear a response from [facility] in over and hour so we called for an update. She stated if we could transport him to [facility] since we do not have a morgue. Nurse spoke to charge that we cannot transport a body without consent from wife. [facility] tried multiple times to contact wife Gene Lane and was unable to reach her. We crossed checked [facility] contact with our chart that matched. Jhonathan from [employer] spoke to her director. I called [facility] back to ask what the plan is. Since unable to get permission from family for transport or any kind of [facility] donation will call  home to release they body. I called [facility] right away to let them know to pick him up, they said they will be here within hour.

## 2023-11-11 NOTE — DISCHARGE SUMMARY
poor     Discharged Condition:     Hospital Stay:     Hospital Course:  Yolanda Leigh is a 70 y.o. male who was admitted for the management of  COPD exacerbation ([address]) , presented to ER with Shortness of Breath (SOB pat 3 days, worse today, EMS administered Solumedrol, 2 breathing tx and duoneb PTA. 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 He has lost 40 lbs in the last year and smokes. He had a spont tension pneumothorax 4 months ago with chest tube. He presented to the ED with increased work of breathing, placed on 5L. CXR revealed opacity in the right lower lung with a trace pleural effusion. He was started on IV antibiotics, steriods but continued to decline. He was then placed on Bipap. He was intubated on 11/3/2023 morning. Code status: No CPR/ shocks. He was not able to be weaned from the ventilator for over a week. He had told his wife that he didn't want a trach. Palliative care was following. Pulmonary/ CCM had multiple discussions with the patient's wife about his poor prognosis. Patient was extubated on  and made DNR CC. He was placed on Bipap for support. He  a few hours later. Significant therapeutic interventions: intubation, bronchodilators, IV steriods    Significant Diagnostic Studies:     Radiology:  XR CHEST PORTABLE    Result Date: 2023  1. Right base airspace opacity, slightly improved since November 10, 2023. 2. Severe emphysema. XR CHEST PORTABLE    Result Date: 11/10/2023  Findings of COPD and emphysema. Overall, no significant change since prior exams. XR CHEST PORTABLE    Result Date: 11/10/2023  Persistent consolidation at the right lung base concerning for pneumonia. Satisfactory position of support devices. XR CHEST PORTABLE    Result Date: 2023  No large pleural effusion; findings compatible with infiltrate and right lower lobe atelectasis, as above. CONSULT TO INTERNAL MEDICINE  IP CONSULT TO PULMONOLOGY  IP CONSULT TO PALLIATIVE CARE  IP CONSULT TO PULMONOLOGY  IP CONSULT TO DIETITIAN      The patient was seen and examined on day of discharge and this discharge summary is in conjunction with any daily progress note from day of discharge. Discharge plan:     Disposition:        Time Spent on discharge is  31 mins in patient examination, evaluation, counseling as well as medication reconciliation, prescriptions for required medications, discharge plan and follow up. Electronically signed by   Bree Chong MD  2023  4:53 PM      Thank you Dr. Anjali Zamora, APRN - CNP for the opportunity to be involved in this patient's care.

## 2023-11-11 NOTE — DEATH NOTES
Death Pronouncement Note  Patient's Name: Luh Stewart   Patient's YOB: 1945  MRN Number: [id number]    Admitting Provider: No admitting provider for patient encounter.   Attending Provider: Gino Kim MD    Patient was examined and the following were absent: Pulses, Blood Pressure, and Respiratory effort    I declared the patient dead on 11/11/2023 at 12    Preliminary Cause of Death: cardiopulmonary arrest    Electronically signed by GIN Stubbs CNP on 11/11/23 at 3:01 PM EST

## 2023-11-11 NOTE — PROGRESS NOTES
[facility]  Office: [id number], DO, Candy Tay, DO, Bernard Munoz, DO, Govind Davidson, DO, Yasmani Connelly MD, Ninoska Pablo MD, Gela Roblero MD, Alejandro Nolan MD,  Ian Rosales MD, Roney Russell MD, Caryn Zaldivar MD,  Luanne Todd MD, Nathanael Gama MD, Van Jaramillo DO, Tony Martines MD,  Noreen Cook DO, Ulysses Sprinkle, MD, Yasemin Montaño MD, Chela Phan MD, Hansa Krause MD,  Malachi Perea MD, Luis Booth MD, Ayana Verde MD, Marlo Disla MD, Paul Gallo MD, Gaby Tiwari DO, Liliana Patten, DO, Roland Robles MD,  Geri Peters MD, Dewayne Chau, CNP,  Yury Liang, CNP, Renate Coley, CNP,  Becka Payne, [facility], Dustin Shoulder, CNP, Olman Ham, CNP, Regulo Pedro, CNP, Crystal Fritz, CNP, Yuliana Castellon, CNP, Sonali Boykin, CNP, Tasneem Gamma, CNS, Terry Plate, CNP, Gilbert Shetty, [facility]    Progress Note    11/11/2023    8:14 AM    Name:   Heather Liao  MRN:     [id number]     [address]:      [de-identified]   Room:   [address] [place] [address] Day:  9  Admit Date:  11/2/2023  8:23 AM    PCP:   GIN Richter CNP  Code Status:  DNR-CCA    Subjective:     C/C:   Chief Complaint   Patient presents with    Shortness of Breath     SOB pat 3 days, worse today, EMS administered Solumedrol, 2 breathing tx and duoneb PTA. Interval History Status: not changed. Patient is still on ventilator and sedated. He's having increased tube feed residuals. No BM since 11/2 per nurse. His grandchildren are visiting. 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 He has lost 40 lbs in the last year and smokes. He had a spont tension pneumothorax 4 months ago with chest tube. He presented to the ED with increased work of breathing, placed on 5L.   CXR revealed opacity in the right

## 2023-11-11 NOTE — PLAN OF CARE
Problem: Skin/Tissue Integrity  Goal: Absence of new skin breakdown  Description: 1. Monitor for areas of redness and/or skin breakdown  2. Assess vascular access sites hourly  3. Every 4-6 hours minimum:  Change oxygen saturation probe site  4. Every 4-6 hours:  If on nasal continuous positive airway pressure, respiratory therapy assess nares and determine need for appliance change or resting period. Outcome: Progressing     Problem: Safety - Adult  Goal: Free from fall injury  Outcome: Progressing     Problem: ABCDS Injury Assessment  Goal: Absence of physical injury  Outcome: Progressing     Problem: Respiratory - Adult  Goal: Clear lung sounds  11/10/2023 1913 by Sarahi Verdugo RCP  Outcome: Progressing  Goal: Adequate oxygenation  Description: Adequate oxygenation  11/10/2023 1913 by Sarahi Verdugo RCP  Outcome: Progressing  Goal: Provide mechanical and oxygen support to facilitate gas exchange  11/10/2023 1913 by Sarahi Verdugo RCP  Outcome: Progressing     Problem: Safety - Medical Restraint  Goal: Remains free of injury from restraints (Restraint for Interference with Medical Device)  Description: INTERVENTIONS:  1. Determine that other, less restrictive measures have been tried or would not be effective before applying the restraint  2. Evaluate the patient's condition at the time of restraint application  3. Inform patient/family regarding the reason for restraint  4.  Q2H: Monitor safety, psychosocial status, comfort, nutrition and hydration  Outcome: Progressing  Flowsheets  Taken 11/10/2023 1923 by Ayo North RN  Remains free of injury from restraints (restraint for interference with medical device):   Determine that other, less restrictive measures have been tried or would not be effective before applying the restraint   Evaluate the patient's condition at the time of restraint application   Inform patient/family regarding the reason for restraint   Every 2 hours: Monitor safety,

## 2023-11-12 NOTE — PROGRESS NOTES
Patient picked up by [facility], [facility] home.

## 2024-03-11 NOTE — PROGRESS NOTES
Physician Progress Note      PATIENT:               CASTILLO SALDIVAR  [facility] #:                  [id number]  :                       1945  ADMIT DATE:       2023 8:23 AM  DISCH DATE:        2023 2:58 PM  RESPONDING  PROVIDER #:        EZEKIEL Gamboa NP          QUERY TEXT:    Pt admitted with COPD exacerbation. Pt noted to have PNA, Tracheobronchitis    IV Rocephin/Zithromax.  Acute on chronic respiratory failure with hypoxia and   hypercapnia  WBC 13.5m procal 0.07 Tracheobronchitis, RLL pneumonia - on IV   Rocephin/Zithromax, elevated /min, elevated resp/min. If possible,   please document in the progress notes and discharge summary if you are   evaluating and /or treating any of the following:    The medical record reflects the following:  Risk Factors: PNA, Tracheobronchitis  IV Rocephin/Zithromax.  Acute on chronic   respiratory failure with hypoxia and hypercapnia  Clinical Indicators: WBC 13.5m procal 0.07 Tracheobronchitis, RLL pneumonia -    elevated /min, elevated resp/min  Treatment: Intubated, on IV Rocephin/Zithromax    Thank you.  Anjelica Aguilar RN, Clinical Documentation Integrity, [employer], [facility], [facility]  Options provided:  -- Sepsis confirmed after study and was present on admission  -- Sepsis treated and resolved  -- No Sepsis, localized infection only  -- Sepsis was ruled out  -- Other - I will add my own diagnosis  -- Disagree - Not applicable / Not valid  -- Disagree - Clinically unable to determine / Unknown  -- Refer to Clinical Documentation Reviewer    PROVIDER RESPONSE TEXT:    Sepsis confirmed after study and was present on admission.    Query created by: Anjelica Aguilar on 3/8/2024 9:48 AM      Electronically signed by:  EZEKIEL Gamboa NP 3/11/2024 11:42 AM